# Patient Record
Sex: FEMALE | NOT HISPANIC OR LATINO | Employment: FULL TIME | URBAN - METROPOLITAN AREA
[De-identification: names, ages, dates, MRNs, and addresses within clinical notes are randomized per-mention and may not be internally consistent; named-entity substitution may affect disease eponyms.]

---

## 2017-06-13 ENCOUNTER — TRANSFERRED RECORDS (OUTPATIENT)
Dept: HEALTH INFORMATION MANAGEMENT | Facility: CLINIC | Age: 62
End: 2017-06-13

## 2017-06-13 LAB
HPV ABSTRACT: NORMAL
PAP-ABSTRACT: NORMAL

## 2017-06-14 ENCOUNTER — TRANSFERRED RECORDS (OUTPATIENT)
Dept: HEALTH INFORMATION MANAGEMENT | Facility: CLINIC | Age: 62
End: 2017-06-14

## 2017-06-14 LAB
CHOLEST SERPL-MCNC: 207 MG/DL
GLUCOSE SERPL-MCNC: 86 MG/DL (ref 74–106)
HDLC SERPL-MCNC: 85 MG/DL
HEP C HIM: NORMAL
LDLC SERPL CALC-MCNC: 102 MG/DL
TRIGL SERPL-MCNC: 102 MG/DL

## 2017-06-24 ENCOUNTER — HEALTH MAINTENANCE LETTER (OUTPATIENT)
Age: 62
End: 2017-06-24

## 2018-08-14 ENCOUNTER — TRANSFERRED RECORDS (OUTPATIENT)
Dept: HEALTH INFORMATION MANAGEMENT | Facility: CLINIC | Age: 63
End: 2018-08-14

## 2018-09-05 ENCOUNTER — TRANSFERRED RECORDS (OUTPATIENT)
Dept: HEALTH INFORMATION MANAGEMENT | Facility: CLINIC | Age: 63
End: 2018-09-05

## 2018-09-12 ENCOUNTER — TRANSFERRED RECORDS (OUTPATIENT)
Dept: HEALTH INFORMATION MANAGEMENT | Facility: CLINIC | Age: 63
End: 2018-09-12

## 2018-11-28 ENCOUNTER — RADIANT APPOINTMENT (OUTPATIENT)
Dept: GENERAL RADIOLOGY | Facility: CLINIC | Age: 63
End: 2018-11-28
Attending: FAMILY MEDICINE
Payer: COMMERCIAL

## 2018-11-28 ENCOUNTER — OFFICE VISIT (OUTPATIENT)
Dept: FAMILY MEDICINE | Facility: CLINIC | Age: 63
End: 2018-11-28
Payer: COMMERCIAL

## 2018-11-28 VITALS
HEIGHT: 66 IN | WEIGHT: 109.8 LBS | DIASTOLIC BLOOD PRESSURE: 78 MMHG | SYSTOLIC BLOOD PRESSURE: 120 MMHG | BODY MASS INDEX: 17.64 KG/M2 | TEMPERATURE: 98.2 F | HEART RATE: 72 BPM

## 2018-11-28 DIAGNOSIS — M54.42 ACUTE LEFT-SIDED LOW BACK PAIN WITH LEFT-SIDED SCIATICA: ICD-10-CM

## 2018-11-28 DIAGNOSIS — M54.42 ACUTE LEFT-SIDED LOW BACK PAIN WITH LEFT-SIDED SCIATICA: Primary | ICD-10-CM

## 2018-11-28 PROCEDURE — 99213 OFFICE O/P EST LOW 20 MIN: CPT | Performed by: FAMILY MEDICINE

## 2018-11-28 PROCEDURE — 72100 X-RAY EXAM L-S SPINE 2/3 VWS: CPT | Mod: FY

## 2018-11-28 RX ORDER — TIZANIDINE 2 MG/1
1-4 TABLET ORAL 3 TIMES DAILY
Qty: 30 TABLET | Refills: 1 | Status: SHIPPED | OUTPATIENT
Start: 2018-11-28 | End: 2018-11-28

## 2018-11-28 RX ORDER — RALOXIFENE HYDROCHLORIDE 60 MG/1
1 TABLET, FILM COATED ORAL DAILY
Refills: 3 | COMMUNITY
Start: 2018-11-21 | End: 2019-12-16

## 2018-11-28 RX ORDER — TIZANIDINE 2 MG/1
1-4 TABLET ORAL 3 TIMES DAILY
Qty: 30 TABLET | Refills: 1 | Status: SHIPPED | OUTPATIENT
Start: 2018-11-28

## 2018-11-28 NOTE — PROGRESS NOTES
SUBJECTIVE:                                                    Latonya Thrasher is a 63 year old female who presents to clinic today for the following health issues:    Joint Pain    Onset: 1.5 week, started in the lower back interminttant pain. no longer having pain in the back, now burning sensation in the left thigh, moves at times to the groin.    Description:   Location: left thigh  Character: Burning    Intensity: moderate    Progression of Symptoms: intermittent    Accompanying Signs & Symptoms:  Other symptoms: none    History:   Previous similar pain: YES- back pain in the past      Precipitating factors:   Trauma or overuse: no     Alleviating factors:  Improved by: heat, walked 2 miles yesterday that seem to help for a short time.   Therapies Tried and outcome:         Problem list and histories reviewed & adjusted, as indicated.  Additional history: started with the back now it is inner thigh   advil helps   Heat and she had  Has not had pain that lasted this long   ? Changing sheets may have made it worse   She has osteoporosis and was started on evista   Not weak                     Patient Active Problem List   Diagnosis     History of basal cell carcinoma     Recurrent UTI     Ganglion cyst     Osteopenia     Lumbar radiculopathy     Past Surgical History:   Procedure Laterality Date     BIOPSY OF SKIN LESION       SURGICAL HISTORY OF -       vein ablation     SURGICAL HISTORY OF -   IN 30'S    LAPAROSCOPY FOR INFERTILITY     TONSILLECTOMY  child       Social History   Substance Use Topics     Smoking status: Never Smoker     Smokeless tobacco: Never Used     Alcohol use Yes      Comment: 3-4 weekly     Family History   Problem Relation Age of Onset     Heart Disease Father       age 90 from valve issue     Hypertension Father      Lipids Father      Dementia Mother      Hypertension Sister      Hypertension Sister            ROS:  Constitutional, HEENT, cardiovascular, pulmonary, gi and gu  "systems are negative, except as otherwise noted.    OBJECTIVE:                                                    /78  Pulse 72  Temp 98.2  F (36.8  C) (Tympanic)  Ht 5' 5.5\" (1.664 m)  Wt 109 lb 12.8 oz (49.8 kg)  BMI 17.99 kg/m2 Body mass index is 17.99 kg/(m^2).   GENERAL APPEARANCE: healthy, alert and no distress  ABDOMEN: soft, nontender, without hepatosplenomegaly or masses and bowel sounds normal  MS: extremities normal- no gross deformities noted  Comprehensive back pain exam:  Tenderness of lumbar midback , Pain limits the following motions: extension, Lower extremity strength functional and equal on both sides, Lower extremity reflexes within normal limits bilaterally and Lower extremity sensation normal and equal on both sides     Xray reviewed with patient in the office and showed no spondylo no fracture. + DJD   ASSESSMENT/PLAN:                                                      1. Acute left-sided low back pain with left-sided sciatica  Return if worsening or not improving  - XR Lumbar Spine 2/3 Views; Future  - [facility], HAND, AND CHIROPRACTIC REFERRAL; Future  - tiZANidine (ZANAFLEX) 2 MG tablet; Take 0.5-2 tablets (1-4 mg) by mouth 3 times daily  Dispense: 30 tablet; Refill: 1     reports that she has never smoked. She has never used smokeless tobacco.          Nancy Eddy M.D.  [facility]    "

## 2018-11-28 NOTE — MR AVS SNAPSHOT
After Visit Summary   11/28/2018    Latonya Thrasher    MRN: [id number]           Patient Information     Date Of Birth          1955        Visit Information        Provider Department      11/28/2018 1:30 PM Nancy Eddy MD [facility]        Today's Diagnoses     Acute left-sided low back pain with left-sided sciatica    -  1      Care Instructions             Low Back Pain            What is low back pain?   Low back pain is pain and stiffness in the lower back. It is one of the most common reasons people miss work.   How does it occur?   Your lower back is called your lumbar spine. It is made up of 5 bones called lumbar vertebrae. In between the vertebrae are shock absorbers called disks. Back pain can occur from an injury to the vertebrae or when a disk bulges or herniates.   Low back pain is usually caused when a ligament or muscle holding a vertebra in its proper position is strained. Vertebrae are bones that make up the spinal column through which the spinal cord passes. When these muscles or ligaments become weak or strained, the spine loses its stability, resulting in pain.   Low back pain can occur if your job involves lifting and carrying heavy objects, or if you spend a lot of time sitting or standing in one position or bending over. It can be caused by a fall or by unusually strenuous exercise. It can be brought on by the tension and stress that cause headaches in some people. It can even be brought on by violent sneezing or coughing.   People who are overweight may have low back pain because of the added stress on their back.   Back pain may occur when the muscles, joints, bones, and connective tissues of the back become inflamed as a result of an infection or an immune system problem. Arthritic disorders as well as some congenital and degenerative conditions may cause back pain.   Back pain accompanied by loss of bladder or bowel control, trouble moving your legs, or  numbness or tingling in your arms or legs requires immediate medical treatment.   What are the symptoms?   Symptoms include:   pain in the back or legs   stiffness, spasm, or limited motion   The pain may be constant or may happen only in certain positions. It may get worse when you cough, sneeze, bend, twist, or strain during a bowel movement. The pain may be in only one spot or may spread to other areas, most commonly down the buttocks and into the back of the thigh.   A low back strain typically does not produce pain past the knee into the calf or foot. Tingling or numbness in the calf or foot may indicate a herniated disk or pinched nerve.   Be sure to see your healthcare provider if:   You have weakness in your leg, especially if you cannot lift your foot, because this may be a sign of nerve damage.   You have new bowel or bladder problems as well as back pain, which may be a sign of severe injury to your spinal cord.   You have pain that gets worse despite treatment.   How is it diagnosed?   Your healthcare provider will review your medical history and examine you. You may have X-rays, an MRI, CT scan, or a bone scan.   How is it treated?   To treat this condition:   Put an ice pack, gel pack, or package of frozen vegetables, wrapped in a cloth on the area every 3 to 4 hours, for up to 20 minutes at a time for the first 2 or 3 days.   Use a heating pad or hot water bottle. Don't let the heating pad get too hot, and don't fall asleep with it. You could get a burn.   Rest in bed on a firm mattress. Often it helps to lie on your back with your knees raised on a pillow. However, some people prefer to lie on their side with their knees bent. It's best to try to stay active, so try not to rest in bed longer than 1 to 2 days.   Take muscle relaxants as recommended by your healthcare provider.   Take an anti-inflammatory such as ibuprofen, or other medicine as directed by your provider. Nonsteroidal anti-inflammatory  medicines (NSAIDs) may cause stomach bleeding and other problems. These risks increase with age. Read the label and take as directed. Unless recommended by your healthcare provider, do not take for more than 10 days.   Get a back massage by a trained person.   Wear a belt or corset to support your back.   Do the exercises recommended by your provider. Your provider may also prescribe physical therapy.   Talk with a counselor, if your back pain is related to tension caused by emotional problems.   When the pain is gone, ask your healthcare provider about starting an exercise program such as the following:   Exercise moderately every day, using stretching and warm-up exercises suggested by your provider or physical therapist.   Exercise vigorously for about 30 minutes 3 times a week by walking, swimming, using a stationary bicycle, or doing low-impact aerobics.   Exercising regularly will not only help your back, it will also help keep you healthier overall.   How long will the effects last?   The effects of back pain last as long as the cause exists or until your body recovers from the strain, usually a day or two but sometimes weeks.   How can I take care of myself?   In addition to the treatment described above, keep in mind these suggestions:   Practice good posture. Stand with your head up, shoulders straight, chest forward, weight balanced evenly on both feet, and pelvis tucked in.   Lose weight if you are overweight   Keep your core muscles strong. These are your abdominal and back muscles.   Sleep without a pillow under your head.   Pain is the best way to  the pace you should set in increasing your activity and exercise. Minor discomfort, stiffness, soreness, and mild aches need not interfere with activity. However, limit your activities temporarily if:   Your symptoms return.   The pain increases when you are more active.   The pain increases within 24 hours after a new or higher level of activity.    When can I return to my normal activities?   Everyone recovers from an injury at a different rate. Return to your activities depends on how soon your back recovers, not by how many days or weeks it has been since your injury has occurred. In general, the longer you have symptoms before you start treatment, the longer it will take to get better. The goal is to return to your normal activities as soon as is safely possible. If you return too soon you may worsen your injury.   It is important that you have fully recovered from your low back pain before you return to any strenuous activity. You must be able to have the same range of motion that you had before your injury. You must be able to walk and twist without pain.   What can I do to help prevent low back pain?   You can reduce the strain on your back by doing the following:   Don't push with your arms when you move a heavy object. Turn around and push backwards so the strain is taken by your legs.   Whenever you sit, sit in a straight-backed chair and hold your spine against the back of the chair.   Bend your knees and hips and keep your back straight when you lift a heavy object.   Avoid lifting heavy objects higher than your waist.   Hold packages you carry close to your body, with your arms bent.   Use a footrest for one foot when you stand or sit in one spot for a long time. This keeps your back straight.   Bend your knees when you bend over.   Sit close to the pedals when you drive and use your seat belt and a hard backrest or pillow.   Lie on your side with your knees bent when you sleep or rest. It may help to put a pillow between your knees.   Put a pillow under your knees when you sleep on your back.   Raise the foot of the bed 8 inches to discourage sleeping on your stomach unless you have other problems that require that you keep your head elevated.   To rest your back, hold each of these positions for 5?minutes or longer:   Lie on your back, bend  your knees, and put pillows under your knees.   Lie on your back on the floor with a pillow under your neck. Bend your knees to a 90-degree angle, and put your lower legs and feet on a chair.   Lie on your back, bend your knees, and bring one knee up to your chest and hold it there. Repeat with the other knee, then bring both knees to your chest. When holding your knee to your chest, grab your thigh rather than your lower leg to avoid over flexing your knee.     Published by [employer].  This content is reviewed periodically and is subject to change as new health information becomes available. The information is intended to inform and educate and is not a replacement for medical evaluation, advice, diagnosis or treatment by a healthcare professional.   Developed by Clare Mendoza RN, [place], and [facility].   ? 2010 [facility] and/or its affiliates. All Rights Reserved.           Low Back Pain Exercise          Standing hamstring stretch: Put the heel of one leg on a stool about 15 inches high. Keep your leg straight. Lean forward, bending at the hips until you feel a mild stretch in the back of your thigh. Make sure you do not roll your shoulders or bend at the waist when doing this. You want to stretch your leg, not your lower back. Hold the stretch for 15 to 30 seconds. Repeat with each leg 3 times.   Cat and camel: Get down on your hands and knees. Let your stomach sag, allowing your back to curve downward. Hold this position for 5 seconds. Then arch your back and hold for 5 seconds. Do 3 sets of 10.   Quadruped arm and leg raise: Get down on your hands and knees. Pull in your belly button and tighten your abdominal muscles to stiffen your spine. While keeping your abdominals tight, raise one arm and the opposite leg away from you. Hold this position for 5 seconds. Lower your arm and leg slowly and change sides. Do this 10 times on each side.   Pelvic tilt: Lie on your back with your knees bent and your feet  flat on the floor. Tighten your abdominal muscles and push your lower back into the floor. Hold this position for 5 seconds, then relax. Do 3 sets of 10.   Partial curl: Lie on your back with your knees bent and your feet flat on the floor. Tighten your stomach muscles. Tuck your chin to your chest. With your hands stretched out in front of you, curl your upper body forward until your shoulders clear the floor. Hold this position for 3 seconds. Don't hold your breath. It helps to breathe out as you lift your shoulders up. Relax back to the floor. Repeat 10 times. Build to 3 sets of 10. To challenge yourself, clasp your hands behind your head and keep your elbows out to the side.   Gluteal stretch: Lie on your back with both knees bent. Rest the ankle of one leg over the knee of your other leg. Grasp the thigh of the bottom leg and pull toward your chest. You will feel a stretch along the buttocks and possibly along the outside of your hip. Hold the stretch for 15 to 30 seconds. Repeat 3 times with each leg.   Extension exercise:   0. Lie face down on the floor for 5 minutes. If this hurts too much, lie face down with a pillow under your stomach. This should relieve your leg or back pain. When you can lie on your stomach for 5 minutes without a pillow, you can continue with Part B of this exercise.   0. After lying on your stomach for 5 minutes, prop yourself up on your elbows for another 5 minutes. If you can do this without having more leg or buttock pain, you can start doing part C of this exercise.   0. Lie on your stomach with your hands under your shoulders. Then press down on your hands and extend your elbows while keeping your hips flat on the floor. Hold for 1 second and lower yourself to the floor. Do 3 to 5 sets of 10 repetitions. Rest for 1 minute between sets. You should have no pain in your legs when you do this, but it is normal to feel some pain in your lower back.   Do this exercise several times a  day.   Side plank: Lie on your side with your legs, hips, and shoulders in a straight line. Prop yourself up onto your forearm so your elbow is directly under your shoulder. Lift your hips off the floor and balance on your forearm and the outside of your foot. Try to hold this position for 15 seconds, then slowly lower your hip to the ground. Switch sides and repeat. Work up to holding for 1 minute or longer. This exercise can be made easier by starting with your knees and hips flexed toward your chest.   Published by [employer].  This content is reviewed periodically and is subject to change as new health information becomes available. The information is intended to inform and educate and is not a replacement for medical evaluation, advice, diagnosis or treatment by a healthcare professional.   Written by Mary Peterson MS, PT, and Clare Frank PT, [facility], [facility], for [facility]   ? 2010 [facility] and/or its affiliates. All Rights Reserved.         Copyright   Clinical Reference Systems 2011                      Follow-ups after your visit        Additional Services     [facility] PT, HAND, AND CHIROPRACTIC REFERRAL       Physical Therapy, Hand Therapy and Chiropractic Care are available through:  *[facility]  *Hand Therapy (Occupational Therapy or Physical Therapy)  *[facility]    Call one number to schedule at any of the above locations: [phone].    Physical therapy, Hand therapy and/or Chiropractic care has been recommended by your physician as an excellent treatment option to reduce pain and help people return to normal activities, including sports.  Therapy and/or chiropractic care services are a great complement or alternative to expensive and invasive surgery, injections, or long-term use of prescription medications. The primary goal is to identify the underlying problem and provide you the tools to manage your condition on your own.     Please be aware that  "coverage of these services is subject to the terms and limitations of your health insurance plan.  Call member services at your health plan with any benefit or coverage questions.      Please bring the following to your appointment:  *Your personal calendar for scheduling future appointments  *Comfortable clothing                  Future tests that were ordered for you today     Open Future Orders        Priority Expected Expires Ordered    [facility] PT, HAND, AND CHIR[facility]RACTIC REFERRAL Routine  11/28/2019 11/28/2018            Who to contact     Normal or non-critical lab and imaging results will be communicated to you by [employer], letter or phone within 4 business days after the clinic has received the results. If you do not hear from us within 7 days, please contact the clinic through [employer] or phone. If you have a critical or abnormal lab result, we will notify you by phone as soon as possible.  Submit refill requests through [employer] or call your pharmacy and they will forward the refill request to us. Please allow 3 business days for your refill to be completed.          If you need to speak with a  for additional information , please call: [phone]             Additional Information About Your Visit        [employer] Information     [employer] gives you secure access to your electronic health record. If you see a primary care provider, you can also send messages to your care team and make appointments. If you have questions, please call your primary care clinic.  If you do not have a primary care provider, please call [phone] and they will assist you.        Care EveryWhere ID     This is your Care EveryWhere ID. This could be used by other organizations to access your [facility] medical records  [id number]        Your Vitals Were     Pulse Temperature Height BMI (Body Mass Index)          72 98.2  F (36.8  C) (Tympanic) 5' 5.5\" (1.664 m) 17.99 kg/m2         Blood Pressure from Last 3 Encounters: "   11/28/18 120/78   02/03/15 122/72    Weight from Last 3 Encounters:   11/28/18 109 lb 12.8 oz (49.8 kg)   02/03/15 116 lb 3.2 oz (52.7 kg)                 Today's Medication Changes          These changes are accurate as of 11/28/18  2:41 PM.  If you have any questions, ask your nurse or doctor.               Start taking these medicines.        Dose/Directions    tiZANidine 2 MG tablet   Commonly known as:  ZANAFLEX   Used for:  Acute left-sided low back pain with left-sided sciatica   Started by:  Nancy Eddy MD        Dose:  1-4 mg   Take 0.5-2 tablets (1-4 mg) by mouth 3 times daily   Quantity:  30 tablet   Refills:  1         Stop taking these medicines if you haven't already. Please contact your care team if you have questions.     CALCIUM + D PO   Stopped by:  Nancy Eddy MD           MAGNESIUM OXIDE PO   Stopped by:  Nancy Eddy MD           omega-3 acid ethyl esters 1 g capsule   Commonly known as:  Lovaza   Stopped by:  Nancy Eddy MD                Where to get your medicines      [facility] [address] [place] [address] [address] [facility] [address] [place] [address]  [address] [facility] [address]     Phone:  [phone]     tiZANidine 2 MG tablet                Primary Care Provider Office Phone # Fax #    Petty Sirena Hodges [phone] [address] [facility] [address]        Equal Access to Services     [facility]: Hadii mehrdad hinson hadasho Soalee, waaxda luqadaha, qaybta kaalmada blair, gaurang dick. So [facility] [phone].    ATENCIÓN: Si habla español, tiene a jerez disposición servicios gratuitos de asistencia lingüística. Llame al [phone].    We comply with applicable federal civil rights laws and [place] laws. We do not discriminate on the basis of race, color, national origin, age, disability, sex, sexual orientation, or gender identity.             Thank you!     Thank you for choosing [facility]  for your care. Our goal is always to provide you with excellent care. Hearing back from our patients is one way we can continue to improve our services. Please take a few minutes to complete the written survey that you may receive in the mail after your visit with us. Thank you!             Your Updated Medication List - Protect others around you: Learn how to safely use, store and throw away your medicines at www.disposemymeds.org.          This list is accurate as of 11/28/18  2:41 PM.  Always use your most recent med list.                   Brand Name Dispense Instructions for use Diagnosis    BIOTIN PO           calcium carbonate 600 MG tablet    OS-ALONZO 600 mg [place]. [place]     Take 1 Tab by mouth Once Daily.        DAILY MULTIVITAMIN PO      Take by mouth daily        GLUCOSAMINE CHONDROITIN JOINT PO           MELATONIN PO      Take by mouth nightly as needed        OMEGA-3 FISH OIL PO      Take by mouth daily        raloxifene 60 MG tablet    EVISTA     Take 1 tablet by mouth daily        tiZANidine 2 MG tablet    ZANAFLEX    30 tablet    Take 0.5-2 tablets (1-4 mg) by mouth 3 times daily    Acute left-sided low back pain with left-sided sciatica       VITAMIN D PO

## 2018-11-30 ENCOUNTER — THERAPY VISIT (OUTPATIENT)
Dept: PHYSICAL THERAPY | Facility: CLINIC | Age: 63
End: 2018-11-30
Payer: MEDICARE

## 2018-11-30 DIAGNOSIS — M54.42 ACUTE LEFT-SIDED LOW BACK PAIN WITH LEFT-SIDED SCIATICA: ICD-10-CM

## 2018-11-30 PROCEDURE — G8978 MOBILITY CURRENT STATUS: HCPCS | Mod: GP | Performed by: PHYSICAL THERAPIST

## 2018-11-30 PROCEDURE — 97140 MANUAL THERAPY 1/> REGIONS: CPT | Mod: GP | Performed by: PHYSICAL THERAPIST

## 2018-11-30 PROCEDURE — 97161 PT EVAL LOW COMPLEX 20 MIN: CPT | Mod: GP | Performed by: PHYSICAL THERAPIST

## 2018-11-30 PROCEDURE — 97110 THERAPEUTIC EXERCISES: CPT | Mod: GP | Performed by: PHYSICAL THERAPIST

## 2018-11-30 PROCEDURE — G8979 MOBILITY GOAL STATUS: HCPCS | Mod: GP | Performed by: PHYSICAL THERAPIST

## 2018-11-30 NOTE — PROGRESS NOTES
[facility] Initial Evaluation  Subjective:  Patient is a 63 year old female presenting with rehab back hpi. The history is provided by the patient and medical records.   Latonya Thrasher is a 63 year old female with a lumbar condition.  Condition occurred with:  Insidious onset.  Condition occurred: for unknown reasons.  This is a new condition  Low back pain started 2 weeks ago 11/16/18 and then left leg pain started really worsening a week ago..    Patient reports pain:  Lumbar spine left and SI joint left.  Radiates to:  Thigh left.  Pain is described as aching and burning and is intermittent and reported as 5/10.   Pain is worse in the P.M. and worse during the night.  Symptoms are exacerbated by standing, walking and certain positions and relieved by heat and NSAID's (stretching hamstring helpful ).  Since onset symptoms are gradually worsening.  Special tests:  X-ray.      General health as reported by patient is excellent.  Pertinent medical history includes:  Osteoporosis.      Current medications:  Meds to increase bone density (just picked up rx for mm relaxers).  Current occupation is Retired.  Walking for exercise.        Barriers include:  None as reported by the patient.                            Objective:  Standing Alignment:        Lumbar:  Lordosis decr  Pelvic:  Normal                         Lumbar/SI Evaluation  ROM:    AROM Lumbar:   Flexion:        Hands to ankles, decreases back and leg pain  Ext:                    End range pain and peripheralizes pain, moderate loss fo ext   Side Bend:        Left:     Right:   Rotation:           Left:     Right:   Side Glide:        Left:     Right:           Lumbar Myotomes:  normal                Lumbar Dermtomes:  normal                Neural Tension/Mobility:      Left side:SLR (relieving of pain)  negative.     Right side:   SLR  negative.   Lumbar Palpation:    Tenderness present at Left:    Erector Spinae; Piriformis and  Gluteus Medius          SI joint/Sacrum:          Left negative at:    Forward bend standing  Right positive at:    Forward bend standing                                                 General     ROS    Assessment/Plan:    Patient is a 63 year old female with lumbar complaints.    Patient has the following significant findings with corresponding treatment plan.                Diagnosis 1:  Lumbar radiculopathy  Pain -  manual therapy and education  Decreased ROM/flexibility - manual therapy and therapeutic exercise    Therapy Evaluation Codes:   1) History comprised of:   Personal factors that impact the plan of care:      None.    Comorbidity factors that impact the plan of care are:      None.     Medications impacting care: Anti-inflammatory and Muscle relaxant.  2) Examination of Body Systems comprised of:   Body structures and functions that impact the plan of care:      Lumbar spine.   Activity limitations that impact the plan of care are:      Standing, Walking and Laying down.  3) Clinical presentation characteristics are:   Stable/Uncomplicated.  4) Decision-Making    Low complexity using standardized patient assessment instrument and/or measureable assessment of functional outcome.  Cumulative Therapy Evaluation is: Low complexity.    Previous and current functional limitations:  (See Goal Flow Sheet for this information)    Short term and Long term goals: (See Goal Flow Sheet for this information)     Communication ability:  Patient appears to be able to clearly communicate and understand verbal and written communication and follow directions correctly.  Treatment Explanation - The following has been discussed with the patient:   RX ordered/plan of care  Anticipated outcomes  Possible risks and side effects  This patient would benefit from PT intervention to resume normal activities.   Rehab potential is excellent.    Frequency:  2 X week, once daily  Duration:  for 2 weeks tapering to 1 X a week over 2  weeks  Discharge Plan:  Achieve all LTG.  Independent in home treatment program.  Reach maximal therapeutic benefit.    Please refer to the daily flowsheet for treatment today, total treatment time and time spent performing 1:1 timed codes.

## 2018-11-30 NOTE — MR AVS SNAPSHOT
After Visit Summary   11/30/2018    Latonya Thrasher    MRN: [id number]           Patient Information     Date Of Birth          1955        Visit Information        Provider Department      11/30/2018 9:30 AM Alice Conrad, PT [facility]        Today's Diagnoses     Acute left-sided low back pain with left-sided sciatica           Follow-ups after your visit        Your next 10 appointments already scheduled     Dec 03, 2018  8:20 AM CST   RAISA Spine with Alice Conrad PT   [facility] ([facility])    [address] [facility] [facility] [address]   [phone]            Dec 07, 2018  8:50 AM CST   RAISA Spine with Alice Conrda PT   [facility] ([facility])    [address] [facility] [address]   [phone]            Dec 10, 2018  9:00 AM CST   RAISA Spine with Alice Conrad PT   [facility] ([facility])    [address] [facility] [address]   [phone]            Dec 12, 2018  8:00 AM CST   RAISA Spine with Alice Conrad PT   [facility] ([facility])    [address] [facility] [address]   [phone]            Dec 17, 2018  9:40 AM CST   RAISA Spine with Alice Conrad PT   [facility] ([facility])    [address] [facility] [facility] [address]   [phone]            Dec 19, 2018 10:40 AM CST   RAISA Spine with Alice Conrad PT   [facility] ([facility])    [address] [facility] [address]   [phone]            Dec 26, 2018  8:40 AM CST   RAISA Spine with Alice Conrad PT   [facility] ([facility])    [address] [address]   [phone]            Dec 28, 2018  8:10 AM CST   RAISA Spine with Alice Conrad PT   [facility] ([facility])    [address] [place] [id number] [phone]              Who to contact     If you have questions or need follow up information about  today's clinic visit or your schedule please contact [facility] directly at [phone].  Normal or non-critical lab and imaging results will be communicated to you by MyChart, letter or phone within 4 business days after the clinic has received the results. If you do not hear from us within 7 days, please contact the clinic through [employer] or phone. If you have a critical or abnormal lab result, we will notify you by phone as soon as possible.  Submit refill requests through [employer] or call your pharmacy and they will forward the refill request to us. Please allow 3 business days for your refill to be completed.          Additional Information About Your Visit        [employer] Information     [employer] gives you secure access to your electronic health record. If you see a primary care provider, you can also send messages to your care team and make appointments. If you have questions, please call your primary care clinic.  If you do not have a primary care provider, please call [phone] and they will assist you.        Care EveryWhere ID     This is your Care EveryWhere ID. This could be used by other organizations to access your [place] medical records  [id number]         Blood Pressure from Last 3 Encounters:   11/28/18 120/78   02/03/15 122/72    Weight from Last 3 Encounters:   11/28/18 49.8 kg (109 lb 12.8 oz)   02/03/15 52.7 kg (116 lb 3.2 oz)              We Performed the Following     RAISA CERT REPORT     RAISA PT, HAND, AND CHIROPRACTIC REFERRAL     Manual Ther Tech, 1+Regions, EA 15 min     PT Eval, Low Complexity ([id number])     Therapeutic Exercises        Primary Care Provider Office Phone # Fax #    Petty Hodges [phone] [address]  [facility] [address]        Equal Access to Services     PRIYA HIGGINS : Nissa Navarrete, jaswinder nowak, ajith pinto, gaurang dick. So [facility]  [phone].    ATENCIÓN: Si talib casanova, tiene a jerez disposición servicios gratuitos de asistencia lingüística. Jaylen pickard [phone].    We comply with applicable federal civil rights laws and [place] laws. We do not discriminate on the basis of race, color, national origin, age, disability, sex, sexual orientation, or gender identity.            Thank you!     Thank you for choosing [facility]  for your care. Our goal is always to provide you with excellent care. Hearing back from our patients is one way we can continue to improve our services. Please take a few minutes to complete the written survey that you may receive in the mail after your visit with us. Thank you!             Your Updated Medication List - Protect others around you: Learn how to safely use, store and throw away your medicines at www.disposemymeds.org.          This list is accurate as of 11/30/18 12:12 PM.  Always use your most recent med list.                   Brand Name Dispense Instructions for use Diagnosis    BIOTIN PO           calcium carbonate 600 MG tablet    OS-ALONZO 600 mg [place]. Ca     Take 1 Tab by mouth Once Daily.        DAILY MULTIVITAMIN PO      Take by mouth daily        GLUCOSAMINE CHONDROITIN JOINT PO           MELATONIN PO      Take by mouth nightly as needed        OMEGA-3 FISH OIL PO      Take by mouth daily        raloxifene 60 MG tablet    EVISTA     Take 1 tablet by mouth daily        tiZANidine 2 MG tablet    ZANAFLEX    30 tablet    Take 0.5-2 tablets (1-4 mg) by mouth 3 times daily    Acute left-sided low back pain with left-sided sciatica       VITAMIN D PO

## 2018-11-30 NOTE — LETTER
DEPARTMENT OF HEALTH AND HUMAN SERVICES  CENTERS FOR MEDICARE & MEDICAID SERVICES    PLAN/UPDATED PLAN OF PROGRESS FOR OUTPATIENT REHABILITATION    PATIENTS NAME:  Latonya Thrasher   : 1955  PROVIDER NUMBER:    [id number]  HICN: [id number]    PROVIDER NAME: [facility]  MEDICAL RECORD NUMBER: [id number]     START OF CARE DATE:  SOC Date: 18   TYPE:  PT    PRIMARY/TREATMENT DIAGNOSIS: (Pertinent Medical Diagnosis)  Acute left-sided low back pain with left-sided sciatica    VISITS FROM START OF CARE:  Rxs Used: 1     [facility] Initial Evaluation  Subjective:  Patient is a 63 year old female presenting with rehab back hpi. The history is provided by the patient and medical records.   Latonya Thrasher is a 63 year old female with a lumbar condition.  Condition occurred with:  Insidious onset.  Condition occurred: for unknown reasons.  This is a new condition  Low back pain started 2 weeks ago 18 and then left leg pain started really worsening a week ago..    Patient reports pain:  Lumbar spine left and SI joint left.  Radiates to:  Thigh left.  Pain is described as aching and burning and is intermittent and reported as 5/10.   Pain is worse in the P.M. and worse during the night.  Symptoms are exacerbated by standing, walking and certain positions and relieved by heat and NSAID's (stretching hamstring helpful ).  Since onset symptoms are gradually worsening.  Special tests:  X-ray.      General health as reported by patient is excellent.  Pertinent medical history includes:  Osteoporosis.      Current medications:  Meds to increase bone density (just picked up rx for mm relaxers).  Current occupation is Retired.  Walking for exercise.   Barriers include:  None as reported by the patient.                    Objective:  Standing Alignment:    Lumbar:  Lordosis decr  Pelvic:  Normal       Lumbar/SI Evaluation  ROM:    AROM Lumbar:   Flexion:        Hands to ankles,  decreases back and leg pain  Ext:                    End range pain and peripheralizes pain, moderate loss fo ext   Side Bend:        Left:     Right:   Rotation:           Left:     Right:   Side Glide:        Left:     Right:            PATIENTS NAME:  Latonya Thrasher   : 1955       Lumbar Myotomes:  normal  Lumbar Dermtomes:  normal  Neural Tension/Mobility:    Left side:SLR (relieving of pain)  negative.   Right side:   SLR  negative.   Lumbar Palpation:    Tenderness present at Left:    Erector Spinae; Piriformis and Gluteus Medius  SI joint/Sacrum:      Left negative at:    Forward bend standing  Right positive at:    Forward bend standing    Assessment/Plan:    Patient is a 63 year old female with lumbar complaints.    Patient has the following significant findings with corresponding treatment plan.                Diagnosis 1:  Lumbar radiculopathy  Pain -  manual therapy and education  Decreased ROM/flexibility - manual therapy and therapeutic exercise    Therapy Evaluation Codes:   1) History comprised of:   Personal factors that impact the plan of care:      None.    Comorbidity factors that impact the plan of care are:      None.     Medications impacting care: Anti-inflammatory and Muscle relaxant.  2) Examination of Body Systems comprised of:   Body structures and functions that impact the plan of care:      Lumbar spine.   Activity limitations that impact the plan of care are:      Standing, Walking and Laying down.  3) Clinical presentation characteristics are:   Stable/Uncomplicated.  4) Decision-Making    Low complexity using standardized patient assessment instrument and/or measureable assessment of functional outcome.  Cumulative Therapy Evaluation is: Low complexity.    Previous and current functional limitations:  (See Goal Flow Sheet for this information)    Short term and Long term goals: (See Goal Flow Sheet for this information)     Communication ability:  Patient appears to be able to  "clearly communicate and understand verbal and written communication and follow directions correctly.  Treatment Explanation - The following has been discussed with the patient:   RX ordered/plan of care  Anticipated outcomes  Possible risks and side effects  This patient would benefit from PT intervention to resume normal activities.   Rehab potential is excellent.        PATIENTS NAME:  Latonya Thrasher   : 1955      Frequency:  2 X week, once daily  Duration:  for 2 weeks tapering to 1 X a week over 2 weeks  Discharge Plan:  Achieve all LTG.  Independent in home treatment program.  Reach maximal therapeutic benefit.        Caregiver Signature/Credentials _____________________________ Date ________       Treating Provider: Alice Conrad PT     I have reviewed and certified the need for these services and plan of treatment while under my care.        PHYSICIAN'S SIGNATURE:   _________________________________________  Date___________   Nancy Eddy MD     Certification period:  Beginning of Cert date period: 18 to  End of Cert period date: 18     Functional Level Progress Report: Please see attached \"Goal Flow sheet for Functional level.\"    ____X____ Continue Services or       ________ DC Services                Service dates: From  SOC Date: 18 date to present                         "

## 2018-12-03 ENCOUNTER — THERAPY VISIT (OUTPATIENT)
Dept: PHYSICAL THERAPY | Facility: CLINIC | Age: 63
End: 2018-12-03
Payer: MEDICARE

## 2018-12-03 DIAGNOSIS — M54.16 LUMBAR RADICULOPATHY: ICD-10-CM

## 2018-12-03 PROCEDURE — 97140 MANUAL THERAPY 1/> REGIONS: CPT | Mod: GP | Performed by: PHYSICAL THERAPIST

## 2018-12-03 PROCEDURE — 97110 THERAPEUTIC EXERCISES: CPT | Mod: GP | Performed by: PHYSICAL THERAPIST

## 2018-12-05 ENCOUNTER — THERAPY VISIT (OUTPATIENT)
Dept: PHYSICAL THERAPY | Facility: CLINIC | Age: 63
End: 2018-12-05
Payer: MEDICARE

## 2018-12-05 DIAGNOSIS — M54.16 LUMBAR RADICULOPATHY: ICD-10-CM

## 2018-12-05 PROCEDURE — 97140 MANUAL THERAPY 1/> REGIONS: CPT | Mod: GP | Performed by: PHYSICAL THERAPIST

## 2018-12-05 PROCEDURE — 97110 THERAPEUTIC EXERCISES: CPT | Mod: GP | Performed by: PHYSICAL THERAPIST

## 2018-12-10 ENCOUNTER — THERAPY VISIT (OUTPATIENT)
Dept: PHYSICAL THERAPY | Facility: CLINIC | Age: 63
End: 2018-12-10
Payer: MEDICARE

## 2018-12-10 DIAGNOSIS — M54.16 LUMBAR RADICULOPATHY: ICD-10-CM

## 2018-12-10 PROCEDURE — 97110 THERAPEUTIC EXERCISES: CPT | Mod: GP | Performed by: PHYSICAL THERAPIST

## 2018-12-10 PROCEDURE — 97140 MANUAL THERAPY 1/> REGIONS: CPT | Mod: GP | Performed by: PHYSICAL THERAPIST

## 2018-12-10 NOTE — PROGRESS NOTES
Subjective:  HPI                    Objective:  System    Physical Exam    General     ROS    Assessment/Plan:    DISCHARGE REPORT    Progress reporting period is from 11/30/18 to 12/10/2018.       SUBJECTIVE  Subjective changes noted by patient:    Subjective: Latonya really feels good.  She wakes up sometimes with burning into leg but is able to reduce with flexion exercises.  She is sometimes damir to do full press up without pain.    Current Pain level: 2/10.      Initial Pain level: 5/10.   Changes in function:  Yes (See Goal flowsheet attached for changes in current functional level)  Adverse reaction to treatment or activity: None    OBJECTIVE  Changes noted in objective findings:  Yes,   Objective: Mmild loss of lumbar extension with full press up, pain only increase after several reps.  Felxion - hands to ankles no pain.  Did well with progression of trunk stability.  Piriformis mildly tender     ASSESSMENT/PLAN  Updated problem list and treatment plan: Diagnosis 1:  Lumbar radiculopathy  Pain -  home program  Decreased ROM/flexibility - home program  Decreased strength - home program  STG/LTGs have been met or progress has been made towards goals:  Yes (See Goal flow sheet completed today.)  Assessment of Progress: The patient has met all of their long term goals.  Self Management Plans:  Patient is independent in a home treatment program.  I have re-evaluated this patient and find that the nature, scope, duration and intensity of the therapy is appropriate for the medical condition of the patient.  Latonya continues to require the following intervention to meet STG and LTG's:  PT intervention is no longer required to meet STG/LTG.    Recommendations:  This patient is ready to be discharged from therapy and continue their home treatment program.    Please refer to the daily flowsheet for treatment today, total treatment time and time spent performing 1:1 timed codes.

## 2018-12-27 PROBLEM — M54.16 LUMBAR RADICULOPATHY: Status: RESOLVED | Noted: 2018-12-03 | Resolved: 2018-12-27

## 2019-10-29 ENCOUNTER — TELEPHONE (OUTPATIENT)
Dept: FAMILY MEDICINE | Facility: CLINIC | Age: 64
End: 2019-10-29

## 2019-10-29 NOTE — TELEPHONE ENCOUNTER
Panel Management Review      Patient has the following on her problem list: None      Composite cancer screening  Chart review shows that this patient is due/due soon for the following Colonoscopy  Summary:    Patient is due/failing the following:   Medicare Visit and COLONOSCOPY    Action needed:   Patient needs office visit for a Medicare Visit. and Patient needs referral/order: colonoscopy    Type of outreach:    Phone, spoke to patient.  made appointment for 10/31/2019 at 11:30 am    Questions for provider review:    None                                                                                                                                    Ivette Pitts CMA       Chart routed to none.

## 2019-10-31 ENCOUNTER — ALLIED HEALTH/NURSE VISIT (OUTPATIENT)
Dept: FAMILY MEDICINE | Facility: CLINIC | Age: 64
End: 2019-10-31
Payer: COMMERCIAL

## 2019-10-31 DIAGNOSIS — Z12.11 SPECIAL SCREENING FOR MALIGNANT NEOPLASMS, COLON: ICD-10-CM

## 2019-10-31 DIAGNOSIS — Z23 NEED FOR PNEUMOCOCCAL VACCINATION: Primary | ICD-10-CM

## 2019-10-31 DIAGNOSIS — Z12.11 SCREEN FOR COLON CANCER: ICD-10-CM

## 2019-10-31 PROCEDURE — G0009 ADMIN PNEUMOCOCCAL VACCINE: HCPCS

## 2019-10-31 PROCEDURE — 99207 ZZC NO CHARGE NURSE ONLY: CPT

## 2019-10-31 PROCEDURE — 90732 PPSV23 VACC 2 YRS+ SUBQ/IM: CPT

## 2019-10-31 NOTE — PROGRESS NOTES
Prior to immunization administration, verified patients identity using patient s name and date of birth. Please see Immunization Activity for additional information.     Screening Questionnaire for Adult Immunization    Are you sick today?   No   Do you have allergies to medications, food, a vaccine component or latex?   No   Have you ever had a serious reaction after receiving a vaccination?   No   Do you have a long-term health problem with heart disease, lung disease, asthma, kidney disease, metabolic disease (e.g. diabetes), anemia, or other blood disorder?   No   Do you have cancer, leukemia, HIV/AIDS, or any other immune system problem?   No   In the past 3 months, have you taken medications that affect  your immune system, such as prednisone, other steroids, or anticancer drugs; drugs for the treatment of rheumatoid arthritis, Crohn s disease, or psoriasis; or have you had radiation treatments?   No   Have you had a seizure, or a brain or other nervous system problem?   No   During the past year, have you received a transfusion of blood or blood     products, or been given immune (gamma) globulin or antiviral drug?   No   For women: Are you pregnant or is there a chance you could become        pregnant during the next month?   No   Have you received any vaccinations in the past 4 weeks?   No     Immunization questionnaire answers were all negative.        Per orders of Dr. salazar, injection of pneu 23 given by Kae Moran CMA. Patient instructed to remain in clinic for 15 minutes afterwards, and to report any adverse reaction to me immediately.       Screening performed by Kae Moran CMA on 10/31/2019 at 12:56 PM.

## 2019-12-16 ENCOUNTER — HEALTH MAINTENANCE LETTER (OUTPATIENT)
Age: 64
End: 2019-12-16

## 2019-12-17 ENCOUNTER — ANESTHESIA EVENT (OUTPATIENT)
Dept: GASTROENTEROLOGY | Facility: CLINIC | Age: 64
End: 2019-12-17
Payer: COMMERCIAL

## 2019-12-17 NOTE — ANESTHESIA PREPROCEDURE EVALUATION
"Anesthesia Pre-Procedure Evaluation    Patient: Latonya Thrasher   MRN: [id number] : 1955          Preoperative Diagnosis: Special screening for malignant neoplasms, colon [Z12.11]    Procedure(s):  COLONOSCOPY    Past Medical History:   Diagnosis Date     Basal cell carcinoma      Past Surgical History:   Procedure Laterality Date     BIOPSY OF SKIN LESION       SURGICAL HISTORY OF -       vein ablation     SURGICAL HISTORY OF -   IN 30'S    LAPAROSCOPY FOR INFERTILITY     TONSILLECTOMY  child       Anesthesia Evaluation     . Pt has had prior anesthetic. Type: General           ROS/MED HX    ENT/Pulmonary:       Neurologic:       Cardiovascular:         METS/Exercise Tolerance:     Hematologic:         Musculoskeletal:         GI/Hepatic:         Renal/Genitourinary:         Endo:         Psychiatric:         Infectious Disease:         Malignancy:   (+) Malignancy History of Skin          Other:                          Physical Exam  Normal systems: cardiovascular, pulmonary and dental    Airway   Mallampati: II  TM distance: >3 FB  Neck ROM: full    Dental     Cardiovascular       Pulmonary             Lab Results   Component Value Date    GLC 86 2017       Preop Vitals  BP Readings from Last 3 Encounters:   18 120/78   02/03/15 122/72    Pulse Readings from Last 3 Encounters:   18 72   02/03/15 68      Resp Readings from Last 3 Encounters:   02/03/15 16    SpO2 Readings from Last 3 Encounters:   02/03/15 99%      Temp Readings from Last 1 Encounters:   18 36.8  C (98.2  F) (Tympanic)    Ht Readings from Last 1 Encounters:   18 1.664 m (5' 5.5\")      Wt Readings from Last 1 Encounters:   18 49.8 kg (109 lb 12.8 oz)    Estimated body mass index is 17.99 kg/m  as calculated from the following:    Height as of 18: 1.664 m (5' 5.5\").    Weight as of 18: 49.8 kg (109 lb 12.8 oz).       Anesthesia Plan      History & Physical Review  History and physical reviewed " and following examination; no interval change.    ASA Status:  2 .    NPO Status:  > 6 hours    Plan for General Maintenance will be TIVA.           Postoperative Care      Consents  Anesthetic plan, risks, benefits and alternatives discussed with:  Patient..                 LINA Moore CRNA

## 2019-12-19 ENCOUNTER — HOSPITAL ENCOUNTER (OUTPATIENT)
Facility: CLINIC | Age: 64
Discharge: HOME | End: 2019-12-19
Attending: SURGERY | Admitting: SURGERY
Payer: COMMERCIAL

## 2019-12-19 ENCOUNTER — ANESTHESIA (OUTPATIENT)
Dept: GASTROENTEROLOGY | Facility: CLINIC | Age: 64
End: 2019-12-19
Payer: COMMERCIAL

## 2019-12-19 VITALS
HEIGHT: 65 IN | HEART RATE: 59 BPM | BODY MASS INDEX: 18.16 KG/M2 | RESPIRATION RATE: 15 BRPM | TEMPERATURE: 97.7 F | SYSTOLIC BLOOD PRESSURE: 104 MMHG | OXYGEN SATURATION: 98 % | DIASTOLIC BLOOD PRESSURE: 65 MMHG | WEIGHT: 109 LBS

## 2019-12-19 LAB — COLONOSCOPY: NORMAL

## 2019-12-19 PROCEDURE — 25000125 ZZHC RX 250: Performed by: SURGERY

## 2019-12-19 PROCEDURE — 25000128 H RX IP 250 OP 636: Performed by: NURSE ANESTHETIST, CERTIFIED REGISTERED

## 2019-12-19 PROCEDURE — 37000008 ZZH ANESTHESIA TECHNICAL FEE, 1ST 30 MIN: Performed by: SURGERY

## 2019-12-19 PROCEDURE — 37000009 ZZH ANESTHESIA TECHNICAL FEE, EACH ADDTL 15 MIN: Performed by: SURGERY

## 2019-12-19 PROCEDURE — 25800030 ZZH RX IP 258 OP 636: Performed by: NURSE ANESTHETIST, CERTIFIED REGISTERED

## 2019-12-19 PROCEDURE — 88305 TISSUE EXAM BY PATHOLOGIST: CPT | Performed by: SURGERY

## 2019-12-19 PROCEDURE — 45385 COLONOSCOPY W/LESION REMOVAL: CPT | Performed by: SURGERY

## 2019-12-19 PROCEDURE — 88305 TISSUE EXAM BY PATHOLOGIST: CPT | Mod: 26,59 | Performed by: SURGERY

## 2019-12-19 PROCEDURE — 25000125 ZZHC RX 250: Performed by: NURSE ANESTHETIST, CERTIFIED REGISTERED

## 2019-12-19 PROCEDURE — 45385 COLONOSCOPY W/LESION REMOVAL: CPT | Mod: PT | Performed by: SURGERY

## 2019-12-19 RX ORDER — PROPOFOL 10 MG/ML
INJECTION, EMULSION INTRAVENOUS PRN
Status: DISCONTINUED | OUTPATIENT
Start: 2019-12-19 | End: 2019-12-19

## 2019-12-19 RX ORDER — LIDOCAINE 40 MG/G
CREAM TOPICAL
Status: DISCONTINUED | OUTPATIENT
Start: 2019-12-19 | End: 2019-12-19 | Stop reason: HOSPADM

## 2019-12-19 RX ORDER — SODIUM CHLORIDE, SODIUM LACTATE, POTASSIUM CHLORIDE, CALCIUM CHLORIDE 600; 310; 30; 20 MG/100ML; MG/100ML; MG/100ML; MG/100ML
INJECTION, SOLUTION INTRAVENOUS CONTINUOUS
Status: DISCONTINUED | OUTPATIENT
Start: 2019-12-19 | End: 2019-12-19 | Stop reason: HOSPADM

## 2019-12-19 RX ORDER — RALOXIFENE HYDROCHLORIDE 60 MG/1
60 TABLET, FILM COATED ORAL DAILY
COMMUNITY

## 2019-12-19 RX ORDER — ONDANSETRON 2 MG/ML
4 INJECTION INTRAMUSCULAR; INTRAVENOUS
Status: DISCONTINUED | OUTPATIENT
Start: 2019-12-19 | End: 2019-12-19 | Stop reason: HOSPADM

## 2019-12-19 RX ORDER — PROPOFOL 10 MG/ML
INJECTION, EMULSION INTRAVENOUS CONTINUOUS PRN
Status: DISCONTINUED | OUTPATIENT
Start: 2019-12-19 | End: 2019-12-19

## 2019-12-19 RX ADMIN — PROPOFOL 100 MG: 10 INJECTION, EMULSION INTRAVENOUS at 09:34

## 2019-12-19 RX ADMIN — PROPOFOL 200 MCG/KG/MIN: 10 INJECTION, EMULSION INTRAVENOUS at 09:34

## 2019-12-19 RX ADMIN — LIDOCAINE HYDROCHLORIDE 1 ML: 10 INJECTION, SOLUTION EPIDURAL; INFILTRATION; INTRACAUDAL; PERINEURAL at 09:04

## 2019-12-19 RX ADMIN — SODIUM CHLORIDE, POTASSIUM CHLORIDE, SODIUM LACTATE AND CALCIUM CHLORIDE: 600; 310; 30; 20 INJECTION, SOLUTION INTRAVENOUS at 09:04

## 2019-12-19 ASSESSMENT — MIFFLIN-ST. JEOR: SCORE: 1035.3

## 2019-12-19 NOTE — H&P
64 year old year old female here for colonoscopy for screening.    Patient Active Problem List   Diagnosis     History of basal cell carcinoma     Recurrent UTI     Ganglion cyst     Osteopenia       Past Medical History:   Diagnosis Date     Basal cell carcinoma        Past Surgical History:   Procedure Laterality Date     BIOPSY OF SKIN LESION       SURGICAL HISTORY OF -       vein ablation     SURGICAL HISTORY OF -   IN 30'S    LAPAROSCOPY FOR INFERTILITY     TONSILLECTOMY  child       [facility]    No current outpatient medications on file.       No Known Allergies    Pt reports that she has never smoked. She has never used smokeless tobacco. She reports current alcohol use. She reports that she does not use drugs.    Exam:  There were no vitals taken for this visit.    Awake, Alert OX3  Lungs - CTA bilaterally  CV - RRR, no murmurs, distal pulses intact  Abd - soft, non-distended, non-tender, +BS  Extr - No cyanosis or edema    A/P 64 year old year old female in need of colonoscopy for screening. Risks, benefits, alternatives, and complications were discussed including the possibility of perforation and the patient agreed to proceed    Kendall Rice MD

## 2019-12-19 NOTE — ANESTHESIA POSTPROCEDURE EVALUATION
Patient: Latonya Thrasher    Procedure(s):  COLONOSCOPY, FLEXIBLE, WITH LESION REMOVAL USING SNARE    Diagnosis:Special screening for malignant neoplasms, colon [Z12.11]  Diagnosis Additional Information: No value filed.    Anesthesia Type:  General    Note:  Anesthesia Post Evaluation    Patient location during evaluation: Bedside  Patient participation: Able to fully participate in evaluation  Level of consciousness: awake and alert  Pain management: adequate  Airway patency: patent  Cardiovascular status: acceptable  Respiratory status: acceptable  Hydration status: acceptable  PONV: none     Anesthetic complications: None          Last vitals:  Vitals:    12/19/19 0836   BP: 118/75   Resp: 16   Temp: 36.5  C (97.7  F)   SpO2: 100%         Electronically Signed By: Loi Varma CRNA, APRN CRNA  December 19, 2019  10:18 AM

## 2019-12-19 NOTE — BRIEF OP NOTE
[facility]   Brief Operative Note    Pre-operative diagnosis: Special screening for malignant neoplasms, colon [Z12.11]   Post-operative diagnosis polyp X 2, otherwise normal     Procedure: Procedure(s):  COLONOSCOPY, FLEXIBLE, WITH LESION REMOVAL USING SNARE   Surgeon(s): Surgeon(s) and Role:     * Kendall Rice MD - Primary   Estimated blood loss: * No values recorded between 12/19/2019 12:00 AM and 12/19/2019 10:18 AM *    Specimens: ID Type Source Tests Collected by Time Destination   A : at 25cm Polyp Colon SURGICAL PATHOLOGY EXAM Kendall Rice MD 12/19/2019 10:13 AM    B :  Polyp Large Intestine, Cecum SURGICAL PATHOLOGY EXAM Kendall Rice MD 12/19/2019 10:16 AM       Findings: 1. Polyps as outlined  2. Colon otherwise normal

## 2019-12-19 NOTE — ANESTHESIA CARE TRANSFER NOTE
Patient: Latonya Thrasher    Procedure(s):  COLONOSCOPY, FLEXIBLE, WITH LESION REMOVAL USING SNARE    Diagnosis: Special screening for malignant neoplasms, colon [Z12.11]  Diagnosis Additional Information: No value filed.    Anesthesia Type:   General     Note:  Airway :Nasal Cannula  Patient transferred to:Phase II  Handoff Report: Identifed the Patient, Identified the Reponsible Provider, Reviewed the pertinent medical history, Discussed the surgical course, Reviewed Intra-OP anesthesia mangement and issues during anesthesia, Set expectations for post-procedure period and Allowed opportunity for questions and acknowledgement of understanding      Vitals: (Last set prior to Anesthesia Care Transfer)    CRNA VITALS  12/19/2019 0947 - 12/19/2019 1018      12/19/2019             NIBP:  93/51    Pulse:  71    NIBP Mean:  70    SpO2:  98 %                Electronically Signed By: Loi Varma CRNA, APRN CRNA  December 19, 2019  10:18 AM

## 2019-12-23 LAB — COPATH REPORT: NORMAL

## 2020-04-03 ENCOUNTER — NURSE TRIAGE (OUTPATIENT)
Dept: FAMILY MEDICINE | Facility: CLINIC | Age: 65
End: 2020-04-03

## 2020-04-03 ENCOUNTER — APPOINTMENT (OUTPATIENT)
Dept: GENERAL RADIOLOGY | Facility: CLINIC | Age: 65
End: 2020-04-03
Attending: STUDENT IN AN ORGANIZED HEALTH CARE EDUCATION/TRAINING PROGRAM
Payer: COMMERCIAL

## 2020-04-03 ENCOUNTER — HOSPITAL ENCOUNTER (EMERGENCY)
Facility: CLINIC | Age: 65
Discharge: HOME | End: 2020-04-03
Attending: STUDENT IN AN ORGANIZED HEALTH CARE EDUCATION/TRAINING PROGRAM | Admitting: STUDENT IN AN ORGANIZED HEALTH CARE EDUCATION/TRAINING PROGRAM
Payer: COMMERCIAL

## 2020-04-03 VITALS
WEIGHT: 110 LBS | SYSTOLIC BLOOD PRESSURE: 130 MMHG | DIASTOLIC BLOOD PRESSURE: 77 MMHG | OXYGEN SATURATION: 97 % | BODY MASS INDEX: 18.3 KG/M2 | TEMPERATURE: 97.5 F | RESPIRATION RATE: 15 BRPM
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
## 2020-04-03 DIAGNOSIS — S52.021A CLOSED FRACTURE OF RIGHT OLECRANON PROCESS, INITIAL ENCOUNTER: ICD-10-CM

## 2020-04-03 DIAGNOSIS — S09.90XA CLOSED HEAD INJURY, INITIAL ENCOUNTER: ICD-10-CM

## 2020-04-03 PROCEDURE — 73070 X-RAY EXAM OF ELBOW: CPT | Mod: RT

## 2020-04-03 PROCEDURE — 99284 EMERGENCY DEPT VISIT MOD MDM: CPT | Mod: 57 | Performed by: STUDENT IN AN ORGANIZED HEALTH CARE EDUCATION/TRAINING PROGRAM

## 2020-04-03 PROCEDURE — 24670 CLTX ULNAR FX PROX W/O MNPJ: CPT | Mod: 54 | Performed by: STUDENT IN AN ORGANIZED HEALTH CARE EDUCATION/TRAINING PROGRAM

## 2020-04-03 PROCEDURE — 24670 CLTX ULNAR FX PROX W/O MNPJ: CPT | Mod: RT | Performed by: STUDENT IN AN ORGANIZED HEALTH CARE EDUCATION/TRAINING PROGRAM

## 2020-04-03 PROCEDURE — 99284 EMERGENCY DEPT VISIT MOD MDM: CPT | Mod: 25 | Performed by: STUDENT IN AN ORGANIZED HEALTH CARE EDUCATION/TRAINING PROGRAM

## 2020-04-03 RX ORDER — OXYCODONE HYDROCHLORIDE 5 MG/1
5 TABLET ORAL EVERY 6 HOURS PRN
Qty: 8 TABLET | Refills: 0 | Status: SHIPPED | OUTPATIENT
Start: 2020-04-03

## 2020-04-03 NOTE — ED NOTES
Assisted pt with ring removal per pt request. Sling applied, difficult placement due to angle of splint. Discharge instructions reviewed, all questions answered.

## 2020-04-03 NOTE — TELEPHONE ENCOUNTER
Patient walked into clinic with .  Asking to be seen after fall while walking today.  EMS called to scene was told her vitals were stable and advised to seek eval at ,ED.  Patient went to [facility] it was closed so she walked into clinic.  Reports hitting back of her head hard, painful, feeling woozy. Denies loss of consciousness.  Right elbow pain, limited ROM.  Advised ED assessment. Patient prefers [place] ED.  Call placed to ED informed of above.  Call placed to patient to inform of current policy, no visitors allowed in hospital,patient verbalizes understanding.  Charlotte Styles RN

## 2020-04-03 NOTE — ED PROVIDER NOTES
History     Chief Complaint   Patient presents with     Injury     right elbow and back of head, from fall on ice this morning, no blood tinners     HPI  Latonya Thrasher is a 65 year old female who presents for evaluation of right elbow pain after fall which occurred at 8:30 AM.  Patient explains that she slipped and fell while walking around [place], fell backwards onto her right elbow but also struck the back of her head.  She was wearing a thick winter hat and denies significant closed head injury, loss of consciousness, confusion, neck pain, back pain, or other injuries.  She has been ambulatory since the incident but primary concern is with regards to the right elbow pain.    Allergies:  No Known Allergies    Problem List:    Patient Active Problem List    Diagnosis Date Noted     Osteopenia 02/10/2015     Priority: Medium     History of basal cell carcinoma 2015     Priority: Medium     Chest and face  [facility]       Recurrent UTI 2015     Priority: Medium     Ganglion cyst 2015     Priority: Medium        Past Medical History:    Past Medical History:   Diagnosis Date     Basal cell carcinoma        Past Surgical History:    Past Surgical History:   Procedure Laterality Date     BIOPSY OF SKIN LESION       SURGICAL HISTORY OF -       vein ablation     SURGICAL HISTORY OF -   IN S    LAPAROSCOPY FOR INFERTILITY     TONSILLECTOMY  child       Family History:    Family History   Problem Relation Age of Onset     Dementia Mother      Heart Disease Father          age 90 from valve issue     Hypertension Father      Lipids Father      Hypertension Sister      Hypertension Sister        Social History:  Marital Status:  Single [1]  Social History     Tobacco Use     Smoking status: Never Smoker     Smokeless tobacco: Never Used   Substance Use Topics     Alcohol use: Yes     Comment: 3-4 weekly     Drug use: No        Medications:    oxyCODONE (ROXICODONE) 5 MG  tablet  BIOTIN PO  calcium carbonate (OS-ALONZO 600 MG [place]. [place]) 600 MG TABS tablet  Cholecalciferol (VITAMIN D PO)  Glucos-Chondroit-Hyaluron-MSM (GLUCOSAMINE CHONDROITIN JOINT PO)  Multiple Vitamin (DAILY MULTIVITAMIN PO)  Omega-3 Fatty Acids (OMEGA-3 FISH OIL PO)  raloxifene (EVISTA) 60 MG tablet  tiZANidine (ZANAFLEX) 2 MG tablet          Review of Systems  Constitutional:  Negative for fever or illness.  Cardiovascular:  Negative for chest discomfort.  Respiratory:  Negative for cough.  Gastrointestinal:  Negative for abdominal pain, nausea or vomiting.  Musculoskeletal:  Positive for right elbow pain.  Negative for neck or back pain.  Neurological:  Negative for headache or dizziness.    All others reviewed and are negative.      Physical Exam   BP: 130/77  Heart Rate: 68  Temp: 97.5  F (36.4  C)  Resp: 15  Weight: 49.9 kg (110 lb)  SpO2: 97 %      Physical Exam  Constitutional:  Well developed, well nourished.  Appears stable and in no acute distress.  Head:  Atraumatic appearance of face.  Negative for Raccoon eyes and [place] sign.  No tenderness of skull.  Eye:  No proptosis or subconjunctival hemorrhage.  Eyelids appear symmetrical.  PERRLA.  Ears:  External auditory canals without blood or discharge, tympanic membranes without hemotympanum.  No mastoid region tenderness.  Neck:  No tenderness of midline cervical vertebra.  Ranging neck freely without being held in self-protecting position.  Cervical collar in place.   Cardiovascular:  No cyanosis.    Respiratory/chest:  Effort normal, no respiratory distress.    Musculoskeletal:  No extremity deformities.  No hip tenderness or pelvic instability.  No step-offs and no tenderness to palpation of midline thoracic or lumbosacral vertebra.  Right olecranon region swelling and tenderness.  Negative for tenderness of right shoulder, humerus, supracondylar region, forearm, wrist or hand.  Neuro:  Patient is alert and verbally responsive.   Skin:  Skin is warm and  dry, not diaphoretic.  No abrasions, contusions, ecchymosis, or lacerations.  Psych:  Normal mood and affect, not overly anxious or clinically intoxicated.      ED Course        Procedures                   No results found for this or any previous visit (from the past 24 hour(s)).    Medications - No data to display    Assessments & Plan (with Medical Decision Making)   Latonya Thrasher is a 65 year old female who presents to the emergency department for evaluation of right elbow pain after fall occurring several hours prior to arrival.  Although she struck her head, she reports that the fall was witnessed and no loss of consciousness or significant neurologic symptoms afterwards.  Based on her symptoms and the clinical examination, there is no evidence to suggest deformity, skin injury, tendon rupture, or neurovascular deficits.  Radiographic imaging was independently reviewed and olecranon fracture has been identified.  Radiologist read is consistent with findings.  On-call [facility] orthopedist Dr. Hu was consulted, reviewed imaging and recommended long-arm splint.  She plans to arrange to have patient follow-up for surgical correction early next week.  The patient was provided a prescription for short course of oxycodone to take only as absolutely necessary for breakthrough pain.          Disclaimer:  This note consists of symbols derived from keyboarding, dictation, and/or voice recognition software.  As a result, there may be errors in the script that have gone undetected.  Please consider this when interpreting information found in the chart.         I have reviewed the nursing notes.    I have reviewed the findings, diagnosis, plan and need for follow up with the patient.      Discharge Medication List as of 4/3/2020  1:28 PM      START taking these medications    Details   oxyCODONE (ROXICODONE) 5 MG tablet Take 1 tablet (5 mg) by mouth every 6 hours as needed for severe pain, Disp-8 tablet,R-0,  Local Print             Final diagnoses:   Closed fracture of right olecranon process, initial encounter   Closed head injury, initial encounter   Fall, initial encounter       4/3/2020   [facility] EMERGENCY DEPARTMENT     Dung Delacruz DO  04/04/20 1941

## 2020-04-03 NOTE — ED NOTES
Pt comes in after a fall this am. Out for her normal walk and slipped and fell. Ended up hitting her head, and landed on right elbow.  saw her fall, EMS was called. EMS evaluated and cleared to walk into Urgent Care, but was closed and referred here. Pt has not taken any pain medications or tylenol or ibuprofen today. No blood thinners, Denies headache, no nausea or vomiting. Neuro intact. Pt in room, oriented to room and call light. Call light within reach.

## 2020-04-03 NOTE — ED AVS SNAPSHOT
[facility] Emergency Department  [address] [address]  Phone:  [phone]  Fax:  [phone]                                    Latonya Thrasher   MRN: [id number]    Department:  [facility] Emergency Department   Date of Visit:  4/3/2020           After Visit Summary Signature Page    I have received my discharge instructions, and my questions have been answered. I have discussed any challenges I see with this plan with the nurse or doctor.    ..........................................................................................................................................  Patient/Patient Representative Signature      ..........................................................................................................................................  Patient Representative Print Name and Relationship to Patient    ..................................................               ................................................  Date                                   Time    ..........................................................................................................................................  Reviewed by Signature/Title    ...................................................              ..............................................  Date                                               Time          22EPIC Rev 08/18

## 2020-04-06 ENCOUNTER — TRANSFERRED RECORDS (OUTPATIENT)
Dept: HEALTH INFORMATION MANAGEMENT | Facility: CLINIC | Age: 65
End: 2020-04-06

## 2020-04-16 ENCOUNTER — TRANSFERRED RECORDS (OUTPATIENT)
Dept: HEALTH INFORMATION MANAGEMENT | Facility: CLINIC | Age: 65
End: 2020-04-16

## 2020-05-14 ENCOUNTER — TRANSFERRED RECORDS (OUTPATIENT)
Dept: HEALTH INFORMATION MANAGEMENT | Facility: CLINIC | Age: 65
End: 2020-05-14

## 2020-08-31 ENCOUNTER — TRANSFERRED RECORDS (OUTPATIENT)
Dept: HEALTH INFORMATION MANAGEMENT | Facility: CLINIC | Age: 65
End: 2020-08-31

## 2020-10-05 ENCOUNTER — TRANSFERRED RECORDS (OUTPATIENT)
Dept: HEALTH INFORMATION MANAGEMENT | Facility: CLINIC | Age: 65
End: 2020-10-05

## 2021-01-15 ENCOUNTER — HEALTH MAINTENANCE LETTER (OUTPATIENT)
Age: 66
End: 2021-01-15

## 2021-09-04 ENCOUNTER — HEALTH MAINTENANCE LETTER (OUTPATIENT)
Age: 66
End: 2021-09-04

## 2022-02-19 ENCOUNTER — HEALTH MAINTENANCE LETTER (OUTPATIENT)
Age: 67
End: 2022-02-19

## 2022-09-22 ENCOUNTER — LAB REQUISITION (OUTPATIENT)
Dept: LAB | Facility: CLINIC | Age: 67
End: 2022-09-22
Payer: COMMERCIAL

## 2022-09-22 DIAGNOSIS — Z13.1 ENCOUNTER FOR SCREENING FOR DIABETES MELLITUS: ICD-10-CM

## 2022-09-22 LAB — HBA1C MFR BLD: 5.9 %

## 2022-09-22 PROCEDURE — 83036 HEMOGLOBIN GLYCOSYLATED A1C: CPT | Mod: ORL | Performed by: OBSTETRICS & GYNECOLOGY

## 2022-10-22 ENCOUNTER — HEALTH MAINTENANCE LETTER (OUTPATIENT)
Age: 67
End: 2022-10-22

## 2023-04-01 ENCOUNTER — HEALTH MAINTENANCE LETTER (OUTPATIENT)
Age: 68
End: 2023-04-01

## 2023-06-01 ENCOUNTER — HEALTH MAINTENANCE LETTER (OUTPATIENT)
Age: 68
End: 2023-06-01

## 2023-06-08 ENCOUNTER — LAB REQUISITION (OUTPATIENT)
Dept: LAB | Facility: CLINIC | Age: 68
End: 2023-06-08

## 2023-06-08 DIAGNOSIS — R73.03 PREDIABETES: ICD-10-CM

## 2023-06-08 LAB — HBA1C MFR BLD: 5.6 %

## 2023-06-08 PROCEDURE — 83036 HEMOGLOBIN GLYCOSYLATED A1C: CPT | Performed by: OBSTETRICS & GYNECOLOGY

## 2023-07-20 ENCOUNTER — TRANSFERRED RECORDS (OUTPATIENT)
Dept: HEALTH INFORMATION MANAGEMENT | Facility: CLINIC | Age: 68
End: 2023-07-20

## 2024-05-09 ENCOUNTER — PATIENT OUTREACH (OUTPATIENT)
Dept: CARE COORDINATION | Facility: CLINIC | Age: 69
End: 2024-05-09
Payer: COMMERCIAL

## 2024-05-23 ENCOUNTER — PATIENT OUTREACH (OUTPATIENT)
Dept: CARE COORDINATION | Facility: CLINIC | Age: 69
End: 2024-05-23
Payer: COMMERCIAL

## 2024-08-11 ENCOUNTER — HEALTH MAINTENANCE LETTER (OUTPATIENT)
Age: 69
End: 2024-08-11

## 2024-09-17 ENCOUNTER — TRANSFERRED RECORDS (OUTPATIENT)
Dept: HEALTH INFORMATION MANAGEMENT | Facility: CLINIC | Age: 69
End: 2024-09-17

## 2024-09-17 ENCOUNTER — LAB REQUISITION (OUTPATIENT)
Dept: LAB | Facility: CLINIC | Age: 69
End: 2024-09-17
Payer: COMMERCIAL

## 2024-09-17 DIAGNOSIS — Z13.220 ENCOUNTER FOR SCREENING FOR LIPOID DISORDERS: ICD-10-CM

## 2024-09-17 PROCEDURE — 80061 LIPID PANEL: CPT | Mod: ORL | Performed by: OBSTETRICS & GYNECOLOGY

## 2024-09-18 LAB
CHOLEST SERPL-MCNC: 203 MG/DL
FASTING STATUS PATIENT QL REPORTED: NO
HDLC SERPL-MCNC: 93 MG/DL
LDLC SERPL CALC-MCNC: 96 MG/DL
NONHDLC SERPL-MCNC: 110 MG/DL
TRIGL SERPL-MCNC: 68 MG/DL

## 2024-09-24 ENCOUNTER — TRANSCRIBE ORDERS (OUTPATIENT)
Dept: OTHER | Age: 69
End: 2024-09-24

## 2024-09-24 DIAGNOSIS — Z12.11 SPECIAL SCREENING FOR MALIGNANT NEOPLASMS, COLON: Primary | ICD-10-CM

## 2024-09-26 ENCOUNTER — TRANSFERRED RECORDS (OUTPATIENT)
Dept: HEALTH INFORMATION MANAGEMENT | Facility: CLINIC | Age: 69
End: 2024-09-26

## 2024-10-10 ENCOUNTER — TELEPHONE (OUTPATIENT)
Dept: GASTROENTEROLOGY | Facility: CLINIC | Age: 69
End: 2024-10-10
Payer: COMMERCIAL

## 2024-10-10 NOTE — TELEPHONE ENCOUNTER
"Endoscopy Scheduling Screen      What insurance is in the chart?  Other:  [facility]    Ordering/Referring Provider: Tamara Tolliver   (If ordering provider performs procedure, schedule with ordering provider unless otherwise instructed. )    BMI: There is no height or weight on file to calculate BMI.     Sedation Ordered  general anesthesia.   BMI<= 45 45 < BMI <= 48 48 < BMI < = 50  BMI > 50   No Restrictions No [facility]  No [facility]  [facility] with exceptions Hospital Only OR Only       Do you have a history of malignant hyperthermia?  No    (Females) Are you currently pregnant?   No     Have you been diagnosed or told you have pulmonary hypertension?   No    Do you have any heart conditions?  No     Do you have an LVAD?  No    Have you been told you have moderate to severe sleep apnea?  No.    Have you been told you have COPD, asthma, or any other lung disease?  No    Have you ever had or are you waiting for an organ transplant?  No. Continue scheduling, no site restrictions.    Have you had a stroke or transient ischemic attack (TIA aka \"mini stroke\" in the last 6 months?   No    Have you been diagnosed with or been told you have cirrhosis of the liver?   No.    Are you currently on dialysis?   No    Do you need assistance transferring?   No    BMI: There is no height or weight on file to calculate BMI.     Is patients BMI > 50?  No    BMI > 40?  No    Do you have a diagnosis of diabetes?  No    Do you take an injectable medication for weight loss or diabetes (excluding insulin)?  No    Do you take the medication Naltrexone?  No    Do you take blood thinners?  No     Prep   Are you currently on dialysis or do you have chronic kidney disease?  No    Do you have a diagnosis of cystic fibrosis (CF)?  No    On a regular basis do you go 3 -5 days between bowel movements?  No    Preferred Pharmacy:  [facility] [employer] [facility] [address]  No Pharmacies Listed    Final Scheduling Details     Procedure " scheduled  Colonoscopy    Surgeon:  Charu     Date of procedure:  1-6-25     Location  [place] - Patient preference.    What is your communication preference for Instructions and/or Bowel Prep?   Email [email or web address]    Patient Reminders:    You will receive a call from a Nurse to review instructions and health history.  This assessment must be completed prior to your procedure.  Failure to complete the Nurse assessment may result in the procedure being cancelled.       On the day of your procedure, please designate an adult(s) who can drive you home stay with you for the next 24 hours. The medicines used in the exam will make you sleepy. You will not be able to drive.       You cannot take public transportation, ride share services, or non-medical taxi service without a responsible caregiver.  Medical transport services are allowed with the requirement that a responsible caregiver will receive you at your destination.  We require that drivers and caregivers are confirmed prior to your procedure.

## 2024-12-05 ENCOUNTER — PATIENT OUTREACH (OUTPATIENT)
Dept: CARE COORDINATION | Facility: CLINIC | Age: 69
End: 2024-12-05
Payer: COMMERCIAL

## 2024-12-20 PROBLEM — M81.0 OSTEOPOROSIS: Status: ACTIVE | Noted: 2024-12-20

## 2024-12-20 NOTE — H&P (VIEW-ONLY)
"  Assessment & Plan     Age-related osteoporosis without current pathological fracture  Has diagnosis of osteoporosis, was taking fosamax, had to stop taking 3-5 years ago because she developed jaw pain. Pt is interested in starting prolia injections. Referral placed for endocrinology. Advised patient to get on the waitlist for endocrinology to possibly be seen sooner.           David Hanks is a 69 year old, presenting for the following health issues:  Referral      12/20/2024     7:37 AM   Additional Questions   Roomed by Caroline FORDE   Accompanied by self     History of Present Illness       Reason for visit:  Want medication for osteoporosis and possible teferral to an endocrinologist for med and monitoring    She eats 2-3 servings of fruits and vegetables daily.She consumes 0 sweetened beverage(s) daily.She exercises with enough effort to increase her heart rate 30 to 60 minutes per day.  She exercises with enough effort to increase her heart rate 7 days per week.   She is taking medications regularly.       Pt has hx of osteoporosis, most recent dexa in 2022. She was taking fosamax in 2018, had to go off of it due to jaw pain. Pt has been treating with calcium, vitamin d, and reloxifene, but is interested in prolia injections. Needs referral to endo. No signs of pathological fracture..       Review of Systems  Constitutional, HEENT, cardiovascular, pulmonary, GI, , musculoskeletal, neuro, skin, endocrine and psych systems are negative, except as otherwise noted.      Objective    /78   Pulse 77   Temp 98  F (36.7  C) (Tympanic)   Resp 16   Ht 1.651 m (5' 5\")   Wt 49.6 kg (109 lb 4.8 oz)   SpO2 98%   BMI 18.19 kg/m    Body mass index is 18.19 kg/m .  Physical Exam   GENERAL: alert and no distress  MS: no gross musculoskeletal defects noted, no edema  PSYCH: mentation appears normal, affect normal/bright          Signed Electronically by: LINA Richardson CNP    "

## 2025-01-03 ENCOUNTER — ANESTHESIA EVENT (OUTPATIENT)
Dept: GASTROENTEROLOGY | Facility: CLINIC | Age: 70
End: 2025-01-03
Payer: COMMERCIAL

## 2025-01-03 RX ORDER — SODIUM CHLORIDE, SODIUM LACTATE, POTASSIUM CHLORIDE, CALCIUM CHLORIDE 600; 310; 30; 20 MG/100ML; MG/100ML; MG/100ML; MG/100ML
INJECTION, SOLUTION INTRAVENOUS CONTINUOUS
Status: CANCELLED | OUTPATIENT
Start: 2025-01-03

## 2025-01-03 NOTE — ANESTHESIA PREPROCEDURE EVALUATION
"Anesthesia Pre-Procedure Evaluation    Patient: Latonya Thrasher   MRN: [id number] : 1955        Procedure : Procedure(s):  Colonoscopy          Past Medical History:   Diagnosis Date    Basal cell carcinoma       Past Surgical History:   Procedure Laterality Date    BIOPSY OF SKIN LESION      SURGICAL HISTORY OF -       vein ablation    SURGICAL HISTORY OF -   IN 30'S    LAPAROSCOPY FOR INFERTILITY    TONSILLECTOMY  child      No Known Allergies   Social History     Tobacco Use    Smoking status: Never    Smokeless tobacco: Never   Substance Use Topics    Alcohol use: Yes     Comment: 3-4 weekly      Wt Readings from Last 1 Encounters:   24 49.6 kg (109 lb 4.8 oz)        Anesthesia Evaluation   Pt has had prior anesthetic.         ROS/MED HX  ENT/Pulmonary:  - neg pulmonary ROS     Neurologic:  - neg neurologic ROS     Cardiovascular:  - neg cardiovascular ROS     METS/Exercise Tolerance:     Hematologic:  - neg hematologic  ROS     Musculoskeletal:  - neg musculoskeletal ROS     GI/Hepatic:  - neg GI/hepatic ROS     Renal/Genitourinary:  - neg Renal ROS     Endo:  - neg endo ROS     Psychiatric/Substance Use:  - neg psychiatric ROS     Infectious Disease:  - neg infectious disease ROS     Malignancy:  - neg malignancy ROS     Other:  - neg other ROS          Physical Exam    Airway        Mallampati: I   TM distance: > 3 FB   Neck ROM: full   Mouth opening: > 3 cm    Respiratory Devices and Support         Dental       (+) Minor Abnormalities - some fillings, tiny chips      Cardiovascular   cardiovascular exam normal          Pulmonary   pulmonary exam normal                OUTSIDE LABS:  CBC: No results found for: \"WBC\", \"HGB\", \"HCT\", \"PLT\"  BMP:   Lab Results   Component Value Date    GLC 86 2017     COAGS: No results found for: \"PTT\", \"INR\", \"FIBR\"  POC: No results found for: \"BGM\", \"HCG\", \"HCGS\"  HEPATIC: No results found for: \"ALBUMIN\", \"PROTTOTAL\", \"ALT\", \"AST\", \"GGT\", \"ALKPHOS\", " "\"BILITOTAL\", \"BILIDIRECT\", \"INEZ\"  OTHER:   Lab Results   Component Value Date    A1C 5.6 06/08/2023       Anesthesia Plan    ASA Status:  2       Anesthesia Type: General.              Consents    Anesthesia Plan(s) and associated risks, benefits, and realistic alternatives discussed. Questions answered and patient/representative(s) expressed understanding.     - Discussed: Risks, Benefits and Alternatives for BOTH SEDATION and the PROCEDURE were discussed     - Discussed with:  Patient            Postoperative Care            Comments:               LINA Ravi CRNA    I have reviewed the pertinent notes and labs in the chart from the past 30 days and (re)examined the patient.  Any updates or changes from those notes are reflected in this note.                                   "

## 2025-01-06 ENCOUNTER — HOSPITAL ENCOUNTER (OUTPATIENT)
Facility: CLINIC | Age: 70
Discharge: HOME | End: 2025-01-06
Attending: SURGERY | Admitting: SURGERY
Payer: COMMERCIAL

## 2025-01-06 ENCOUNTER — ANESTHESIA (OUTPATIENT)
Dept: GASTROENTEROLOGY | Facility: CLINIC | Age: 70
End: 2025-01-06
Payer: COMMERCIAL

## 2025-01-06 VITALS
HEART RATE: 93 BPM | SYSTOLIC BLOOD PRESSURE: 90 MMHG | OXYGEN SATURATION: 98 % | HEIGHT: 65 IN | TEMPERATURE: 98.3 F | RESPIRATION RATE: 16 BRPM | WEIGHT: 109.3 LBS | BODY MASS INDEX: 18.21 KG/M2 | DIASTOLIC BLOOD PRESSURE: 60 MMHG

## 2025-01-06 PROBLEM — D12.6 COLON ADENOMA: Status: ACTIVE | Noted: 2025-01-06

## 2025-01-06 LAB — COLONOSCOPY: NORMAL

## 2025-01-06 PROCEDURE — 250N000011 HC RX IP 250 OP 636: Performed by: NURSE ANESTHETIST, CERTIFIED REGISTERED

## 2025-01-06 PROCEDURE — 370N000017 HC ANESTHESIA TECHNICAL FEE, PER MIN: Performed by: SURGERY

## 2025-01-06 PROCEDURE — 45378 DIAGNOSTIC COLONOSCOPY: CPT | Performed by: SURGERY

## 2025-01-06 PROCEDURE — G0105 COLORECTAL SCRN; HI RISK IND: HCPCS | Performed by: SURGERY

## 2025-01-06 RX ORDER — NALOXONE HYDROCHLORIDE 0.4 MG/ML
0.1 INJECTION, SOLUTION INTRAMUSCULAR; INTRAVENOUS; SUBCUTANEOUS
Status: DISCONTINUED | OUTPATIENT
Start: 2025-01-06 | End: 2025-01-06 | Stop reason: HOSPADM

## 2025-01-06 RX ORDER — OXYCODONE HYDROCHLORIDE 5 MG/1
10 TABLET ORAL
Status: DISCONTINUED | OUTPATIENT
Start: 2025-01-06 | End: 2025-01-06 | Stop reason: HOSPADM

## 2025-01-06 RX ORDER — PROPOFOL 10 MG/ML
INJECTION, EMULSION INTRAVENOUS PRN
Status: DISCONTINUED | OUTPATIENT
Start: 2025-01-06 | End: 2025-01-06

## 2025-01-06 RX ORDER — ACETAMINOPHEN 325 MG/1
975 TABLET ORAL ONCE
Status: DISCONTINUED | OUTPATIENT
Start: 2025-01-06 | End: 2025-01-06 | Stop reason: HOSPADM

## 2025-01-06 RX ORDER — DEXAMETHASONE SODIUM PHOSPHATE 4 MG/ML
4 INJECTION, SOLUTION INTRA-ARTICULAR; INTRALESIONAL; INTRAMUSCULAR; INTRAVENOUS; SOFT TISSUE
Status: DISCONTINUED | OUTPATIENT
Start: 2025-01-06 | End: 2025-01-06 | Stop reason: HOSPADM

## 2025-01-06 RX ORDER — GABAPENTIN 100 MG/1
100 CAPSULE ORAL
Status: DISCONTINUED | OUTPATIENT
Start: 2025-01-06 | End: 2025-01-06 | Stop reason: HOSPADM

## 2025-01-06 RX ORDER — LIDOCAINE 40 MG/G
CREAM TOPICAL
Status: DISCONTINUED | OUTPATIENT
Start: 2025-01-06 | End: 2025-01-06 | Stop reason: HOSPADM

## 2025-01-06 RX ORDER — ONDANSETRON 4 MG/1
4 TABLET, ORALLY DISINTEGRATING ORAL EVERY 30 MIN PRN
Status: DISCONTINUED | OUTPATIENT
Start: 2025-01-06 | End: 2025-01-06 | Stop reason: HOSPADM

## 2025-01-06 RX ORDER — OXYCODONE HYDROCHLORIDE 5 MG/1
5 TABLET ORAL
Status: DISCONTINUED | OUTPATIENT
Start: 2025-01-06 | End: 2025-01-06 | Stop reason: HOSPADM

## 2025-01-06 RX ORDER — GLYCOPYRROLATE 0.2 MG/ML
INJECTION, SOLUTION INTRAMUSCULAR; INTRAVENOUS PRN
Status: DISCONTINUED | OUTPATIENT
Start: 2025-01-06 | End: 2025-01-06

## 2025-01-06 RX ORDER — ONDANSETRON 2 MG/ML
4 INJECTION INTRAMUSCULAR; INTRAVENOUS EVERY 30 MIN PRN
Status: DISCONTINUED | OUTPATIENT
Start: 2025-01-06 | End: 2025-01-06 | Stop reason: HOSPADM

## 2025-01-06 RX ADMIN — GLYCOPYRROLATE 0.2 MG: 0.2 INJECTION, SOLUTION INTRAMUSCULAR; INTRAVENOUS at 08:02

## 2025-01-06 RX ADMIN — PROPOFOL 50 MG: 10 INJECTION, EMULSION INTRAVENOUS at 08:05

## 2025-01-06 RX ADMIN — PROPOFOL 150 MG: 10 INJECTION, EMULSION INTRAVENOUS at 08:03

## 2025-01-06 RX ADMIN — PROPOFOL 25 MG: 10 INJECTION, EMULSION INTRAVENOUS at 08:10

## 2025-01-06 ASSESSMENT — ACTIVITIES OF DAILY LIVING (ADL)
ADLS_ACUITY_SCORE: 41

## 2025-01-06 NOTE — ANESTHESIA POSTPROCEDURE EVALUATION
Patient: Latonya Thrasher    Procedure: Procedure(s):  Colonoscopy       Anesthesia Type:  General    Note:  Disposition: Outpatient   Postop Pain Control: Uneventful            Sign Out: Well controlled pain   PONV: No   Neuro/Psych: Uneventful            Sign Out: Acceptable/Baseline neuro status   Airway/Respiratory: Uneventful            Sign Out: Acceptable/Baseline resp. status   CV/Hemodynamics: Uneventful            Sign Out: Acceptable CV status; No obvious hypovolemia; No obvious fluid overload   Other NRE: NONE   DID A NON-ROUTINE EVENT OCCUR? No           Last vitals:  Vitals Value Taken Time   /88 01/06/25 0845   Temp 36.8  C (98.3  F) 01/06/25 0840   Pulse 75 01/06/25 0845   Resp 16 01/06/25 0840   SpO2 98 % 01/06/25 0840   Vitals shown include unfiled device data.    Electronically Signed By: LINA Galvan CRNA  January 6, 2025  8:52 AM

## 2025-01-06 NOTE — ANESTHESIA CARE TRANSFER NOTE
Patient: Latonya Thrasher    Procedure: Procedure(s):  Colonoscopy       Diagnosis: Special screening for malignant neoplasm of colon [Z12.11]  Diagnosis Additional Information: No value filed.    Anesthesia Type:   General     Note:    Oropharynx: oropharynx clear of all foreign objects and spontaneously breathing  Level of Consciousness: awake  Oxygen Supplementation: room air    Independent Airway: airway patency satisfactory and stable  Dentition: dentition unchanged  Vital Signs Stable: post-procedure vital signs reviewed and stable  Report to RN Given: handoff report given  Patient transferred to: Phase II    Handoff Report: Identifed the Patient, Identified the Reponsible Provider, Reviewed the pertinent medical history, Discussed the surgical course, Reviewed Intra-OP anesthesia mangement and issues during anesthesia, Set expectations for post-procedure period and Allowed opportunity for questions and acknowledgement of understanding      Vitals:  Vitals Value Taken Time   BP     Temp     Pulse     Resp     SpO2         Electronically Signed By: LINA Galvan CRNA  January 6, 2025  8:24 AM

## 2025-01-06 NOTE — PROGRESS NOTES
[place] [facility] DISCHARGE NOTE    Patient discharged to home at 9:02 AM via ambulation. Accompanied by spouse and staff. Discharge instructions reviewed with patient and spouse, opportunity offered to ask questions. Prescriptions - None ordered for discharge. All belongings sent with patient.    Judy Washington RN

## 2025-04-10 ENCOUNTER — PATIENT OUTREACH (OUTPATIENT)
Dept: FAMILY MEDICINE | Facility: CLINIC | Age: 70
End: 2025-04-10
Payer: COMMERCIAL

## 2025-04-10 NOTE — TELEPHONE ENCOUNTER
Patient Quality Outreach    Patient is due for the following:   Physical Annual Wellness Visit    Action(s) Taken:   Schedule a Annual Wellness Visit    Type of outreach:    Sent [employer] message.    Questions for provider review:    None         Cady Ramírez  Chart routed to None.

## 2025-05-04 ENCOUNTER — HEALTH MAINTENANCE LETTER (OUTPATIENT)
Age: 70
End: 2025-05-04

## 2025-07-23 ENCOUNTER — TELEPHONE (OUTPATIENT)
Dept: ENDOCRINOLOGY | Facility: CLINIC | Age: 70
End: 2025-07-23

## 2025-07-23 ENCOUNTER — VIRTUAL VISIT (OUTPATIENT)
Dept: ENDOCRINOLOGY | Facility: CLINIC | Age: 70
End: 2025-07-23
Payer: COMMERCIAL

## 2025-07-23 DIAGNOSIS — M81.8 OTHER OSTEOPOROSIS WITHOUT CURRENT PATHOLOGICAL FRACTURE: ICD-10-CM

## 2025-07-23 DIAGNOSIS — M81.0 AGE-RELATED OSTEOPOROSIS WITHOUT CURRENT PATHOLOGICAL FRACTURE: Primary | ICD-10-CM

## 2025-07-23 PROCEDURE — 98002 SYNCH AUDIO-VIDEO NEW MOD 45: CPT | Performed by: STUDENT IN AN ORGANIZED HEALTH CARE EDUCATION/TRAINING PROGRAM

## 2025-07-23 PROCEDURE — G2211 COMPLEX E/M VISIT ADD ON: HCPCS | Performed by: STUDENT IN AN ORGANIZED HEALTH CARE EDUCATION/TRAINING PROGRAM

## 2025-07-23 PROCEDURE — 1126F AMNT PAIN NOTED NONE PRSNT: CPT | Mod: 95 | Performed by: STUDENT IN AN ORGANIZED HEALTH CARE EDUCATION/TRAINING PROGRAM

## 2025-07-23 NOTE — PROGRESS NOTES
Endocrinology Clinic Visit 7/23/2025      Video-Visit Details    Type of service:  Video Visit  Joined the call at 7/23/2025, 10:10:48 am.  Left the call at 7/23/2025, 10:45:32 am.    Originating Location (pt. Location): Home        Distant Location (provider location):  Off-site    Mode of Communication:  Video Conference via [employer]    Physician has received verbal consent for a Video Visit from the patient? Yes    I spent a total of 50 minutes on the date of encounter reviewing medical records, evaluating the patient, coordinating care and documenting in the EHR, as detailed above.      NAME:  Latonya Thrasher  PCP:  Nancy Eddy  MRN:  [id number]  Reason for Consult:  oateoporosis  Requesting Provider:  Evelina Marcus    Chief Complaint     Chief Complaint   Patient presents with    Consult       History of Present Illness     Latonya Thrasher is a 70 year old female who is seen in video visit for osteoporosis.    She has no other pertinent PMH.  She reported she took fosamax 10 years ago for few month, had jaw pain. She was started  on raloxifene and remained on it.    The patient had a DXA scan in 2008, 2010, 2013, 2018 and most recent one 2022 , all done at an outside facility, 2022 dexa scan showed:   Lumbar spine T-score -2.7, previously -3.2.  Left hip T-score -2 previously -2.  VFA negative.  Since the prior exam BMD has increased by 1.2% at the left hip and 7.5% at the lumbar spine.    Calcium and Vitamin D intake:   Dietary: no milk, lot of cheese and yogurt.  Supplements: citracal with D3 650 mg with 25 mcg in addition to another 25 mcg of d3       Osteoporosis Risk factors:   Previous fractures: elbow fx after falling in 2020  Family history of fragility fracture in parent: hip fracture in mother  Sister also has OP  Current smoking: no  Steroid Use: no  Rheumatoid  arthritis: no  Alcohol (3 or more units per day): no  Reported loss of height: 1/2-1 inch shorter  Menstrual history: age at  menopause: early-mid 40s  No previous pregnancies  Estrogen use after menopause: yes for 5-6 years  Tendency for falls: no  GI history: Malabsorption (IBD, Celiac, gastric bypass ): no  History of kidney stones: no  History of thyroid disease: no  Physical activity: walking 2-4 miles every day , stretching.   Weight history: stable, no concerns.  S/p tooth extraction 6 month ago.      Problem List     Patient Active Problem List   Diagnosis    History of basal cell carcinoma    Recurrent UTI    Ganglion cyst    Osteoporosis    Colon adenoma        Medications     Current Outpatient Medications   Medication Sig Dispense Refill    BIOTIN PO Take 1 tablet by mouth daily       calcium carbonate (OS-ALONZO 600 MG [place] CA) 600 MG TABS tablet Take 1 Tab by mouth Once Daily.      Cholecalciferol (VITAMIN D PO) Take 1 tablet by mouth daily       Glucos-Chondroit-Hyaluron-MSM (GLUCOSAMINE CHONDROITIN JOINT PO)       Multiple Vitamin (DAILY MULTIVITAMIN PO) Take 1 tablet by mouth daily       raloxifene (EVISTA) 60 MG tablet Take 60 mg by mouth daily      tretinoin (RETIN-A) 0.05 % external cream APPLY TO FACE EVERY DAY NIGHTLY       No current facility-administered medications for this visit.        Allergies     No Known Allergies    Medical / Surgical History     Past Medical History:   Diagnosis Date    Basal cell carcinoma      Past Surgical History:   Procedure Laterality Date    BIOPSY OF SKIN LESION      COLONOSCOPY N/A 1/6/2025    Procedure: Colonoscopy;  Surgeon: Bob Box MD;  Location: [facility]    SURGICAL HISTORY OF -       vein ablation    SURGICAL HISTORY OF -   IN 30'S    LAPAROSCOPY FOR INFERTILITY    TONSILLECTOMY  child       Social History     Social History     Socioeconomic History    Marital status: Single     Spouse name: Not on file    Number of children: Not on file    Years of education: Not on file    Highest education level: Not on file   Occupational History    Not on file   Tobacco Use    Smoking  status: Never    Smokeless tobacco: Never   Substance and Sexual Activity    Alcohol use: Yes     Comment: 3-4 weekly    Drug use: No    Sexual activity: Yes     Partners: Male   Other Topics Concern    Parent/sibling w/ CABG, MI or angioplasty before 65F 55M? Not Asked   Social History Narrative    Not on file     Social Drivers of Health     Financial Resource Strain: Not on file   Food Insecurity: Not on file   Transportation Needs: Not on file   Physical Activity: Not on file   Stress: Not on file   Social Connections: Not on file   Interpersonal Safety: Low Risk  (2025)    Interpersonal Safety     Do you feel physically and emotionally safe where you currently live?: Yes     Within the past 12 months, have you been hit, slapped, kicked or otherwise physically hurt by someone?: No     Within the past 12 months, have you been humiliated or emotionally abused in other ways by your partner or ex-partner?: No   Housing Stability: Not on file       Family History     Family History   Problem Relation Age of Onset    Dementia Mother     Heart Disease Father          age 90 from valve issue    Hypertension Father     Lipids Father     Hypertension Sister     Hypertension Sister        ROS     12 ROS completed, pertinent positive and negative in HPI    Physical Exam   There were no vitals taken for this visit.   GENERAL: alert and no distress  EYES: Eyes grossly normal to inspection.  No discharge or erythema, or obvious scleral/conjunctival abnormalities.  RESP: No audible wheeze, cough, or visible cyanosis.    SKIN: Visible skin clear. No significant rash, abnormal pigmentation or lesions.  NEURO: Cranial nerves grossly intact.  Mentation and speech appropriate for age.  PSYCH: Appropriate affect, tone, and pace of words     Labs/Imaging     Pertinent Labs were reviewed and updated in EPIC and discussed briefly.  Radiology Results were  reviewed and updated in EPIC and discussed briefly.    Summary of recent  "findings:   Lab Results   Component Value Date    A1C 5.6 06/08/2023    A1C 5.9 09/22/2022       No results found for: \"TSH\", \"T4\"    No results found for: \"CR\"    Recent Labs   Lab Test 09/17/24  1656 06/14/17  0000   CHOL 203* 207*   HDL 93 85   LDL 96 102*   TRIG 68 102       No results found for: \"[id number]\", \"[id number]\", \"[id number]\"    I personally reviewed the patient's outside records from [employer] EMR, Care Everywhere, and faxed records. Summary of pertinent findings in HPI.    Impression / Plan     1.  Osteoporosis  Risk factors for bone loss include age, postmenopausal status, family history of osteoporosis with mother having hip fracture.  She did not tolerate Fosamax in the past due to jaw pain.  She has been on raloxifene for around 10 years.  Most recent DEXA scan in 2022 with some improvement in her BMD.  She has been following with regular DEXA scan by an outside facility.  She would like to switch care to [place].  We will plan to get a new baseline DEXA scan by [place] machine.  Will decide on further osteoporosis treatment based on DEXA scan result.  We briefly reviewed antiresorptive medication options including IV Reclast and Prolia.  I do not see any baseline labs and will get labs below.  She might be planning for more tooth extraction and dental implant in the near future, she has dentist follow-up in September.      Test and/or medications prescribed today:  Orders Placed This Encounter   Procedures    Comprehensive metabolic panel    TSH with free T4 reflex    25 Hydroxyvitamin D2 and D3         Follow up: In 3-month    The longitudinal plan of care for the diagnosis(es)/condition(s) as documented were addressed during this visit. Due to the added complexity in care, I will continue to support Latonya in the subsequent management and with ongoing continuity of care.       Aleyda Mo MD  Endocrinology, Diabetes and Metabolism  [facility]    "

## 2025-07-23 NOTE — TELEPHONE ENCOUNTER
Left Voicemail (1st Attempt) for the patient to call back and schedule the following:    Appointment type: return  Provider: dr. scott  Return date: 10/23/2025   Specialty phone number: [phone]  Additonal Notes:  Return in about 3 months (around 10/23/2025).     [facility]   Orthopedics, Podiatry, Sports Medicine, Ent ,Eye , Audiology, Adult Endocrine & Diabetes, Nutrition & Medication Therapy Management Specialties   [facility]

## 2025-07-23 NOTE — LETTER
7/23/2025      Latonya Thrasher  [address]  [facility] [address]      Dear Colleague,    Thank you for referring your patient, Latonya Thrasher, to the [facility]. Please see a copy of my visit note below.    Endocrinology Clinic Visit 7/23/2025      Video-Visit Details    Type of service:  Video Visit  Joined the call at 7/23/2025, 10:10:48 am.  Left the call at 7/23/2025, 10:45:32 am.    Originating Location (pt. Location): Home    {PROVIDER LOCATION On-site should be selected for visits conducted from your clinic location or adjoining [facility] hospital, academic office, or other nearby [facility] building. Off-site should be selected for all other provider locations, including home:[id number]}    Distant Location (provider location):  Off-site    Mode of Communication:  Video Conference via [facility]    Physician has received verbal consent for a Video Visit from the patient? Yes    I spent a total of 50 minutes on the date of encounter reviewing medical records, evaluating the patient, coordinating care and documenting in the EHR, as detailed above.      NAME:  Latonya Thrasher  PCP:  Nancy Eddy  MRN:  [id number]  Reason for Consult:  oateoporosis  Requesting Provider:  Evelina Marcus    Chief Complaint     Chief Complaint   Patient presents with     Consult       History of Present Illness     Latonya Thrasher is a 70 year old female who is seen in video visit for osteoporosis.    She has no other pertinent PMH.  She reported she took fosamax 10 years ago for few month, had jaw pain. She was started  on raloxifene and remained on it.    The patient had a DXA scan in 2008, 2010, 2013, 2018 and most recent one 2022 , all done at an outside facility, 2022 dexa scan showed:   Lumbar spine T-score -2.7, previously -3.2.  Left hip T-score -2 previously -2.  VFA negative.  Since the prior exam BMD has increased by 1.2% at the left hip and 7.5% at the lumbar spine.    Calcium and Vitamin D  intake:   Dietary: no milk, lot of cheese and yogurt.  Supplements: citracal with D3 650 mg with 25 mcg in addition to another 25 mcg of d3       Osteoporosis Risk factors:   Previous fractures: elbow fx after falling in 2020  Family history of fragility fracture in parent: hip fracture in mother  Sister also has OP  Current smoking: no  Steroid Use: no  Rheumatoid  arthritis: no  Alcohol (3 or more units per day): no  Reported loss of height: 1/2-1 inch shorter  Menstrual history: age at menopause: early-mid 40s  No previous pregnancies  Estrogen use after menopause: yes for 5-6 years  Tendency for falls: no  GI history: Malabsorption (IBD, Celiac, gastric bypass ): no  History of kidney stones: no  History of thyroid disease: no  Physical activity: walking 2-4 miles every day , stretching.   Weight history: stable, no concerns.  S/p tooth extraction 6 month ago.      Problem List     Patient Active Problem List   Diagnosis     History of basal cell carcinoma     Recurrent UTI     Ganglion cyst     Osteoporosis     Colon adenoma        Medications     Current Outpatient Medications   Medication Sig Dispense Refill     BIOTIN PO Take 1 tablet by mouth daily        calcium carbonate (OS-ALONZO 600 MG [place]. [place]) 600 MG TABS tablet Take 1 Tab by mouth Once Daily.       Cholecalciferol (VITAMIN D PO) Take 1 tablet by mouth daily        Glucos-Chondroit-Hyaluron-MSM (GLUCOSAMINE CHONDROITIN JOINT PO)        Multiple Vitamin (DAILY MULTIVITAMIN PO) Take 1 tablet by mouth daily        raloxifene (EVISTA) 60 MG tablet Take 60 mg by mouth daily       tretinoin (RETIN-A) 0.05 % external cream APPLY TO FACE EVERY DAY NIGHTLY       No current facility-administered medications for this visit.        Allergies     No Known Allergies    Medical / Surgical History     Past Medical History:   Diagnosis Date     Basal cell carcinoma      Past Surgical History:   Procedure Laterality Date     BIOPSY OF SKIN LESION       COLONOSCOPY N/A  2025    Procedure: Colonoscopy;  Surgeon: Bob Box MD;  Location: [facility]     SURGICAL HISTORY OF -       vein ablation     SURGICAL HISTORY OF -   IN 30'S    LAPAROSCOPY FOR INFERTILITY     TONSILLECTOMY  child       Social History     Social History     Socioeconomic History     Marital status: Single     Spouse name: Not on file     Number of children: Not on file     Years of education: Not on file     Highest education level: Not on file   Occupational History     Not on file   Tobacco Use     Smoking status: Never     Smokeless tobacco: Never   Substance and Sexual Activity     Alcohol use: Yes     Comment: 3-4 weekly     Drug use: No     Sexual activity: Yes     Partners: Male   Other Topics Concern     Parent/sibling w/ CABG, MI or angioplasty before 65F 55M? Not Asked   Social History Narrative     Not on file     Social Drivers of Health     Financial Resource Strain: Not on file   Food Insecurity: Not on file   Transportation Needs: Not on file   Physical Activity: Not on file   Stress: Not on file   Social Connections: Not on file   Interpersonal Safety: Low Risk  (2025)    Interpersonal Safety      Do you feel physically and emotionally safe where you currently live?: Yes      Within the past 12 months, have you been hit, slapped, kicked or otherwise physically hurt by someone?: No      Within the past 12 months, have you been humiliated or emotionally abused in other ways by your partner or ex-partner?: No   Housing Stability: Not on file       Family History     Family History   Problem Relation Age of Onset     Dementia Mother      Heart Disease Father          age 90 from valve issue     Hypertension Father      Lipids Father      Hypertension Sister      Hypertension Sister        ROS     12 ROS completed, pertinent positive and negative in HPI    Physical Exam   There were no vitals taken for this visit.   GENERAL: alert and no distress  EYES: Eyes grossly normal to inspection.  No  "discharge or erythema, or obvious scleral/conjunctival abnormalities.  RESP: No audible wheeze, cough, or visible cyanosis.    SKIN: Visible skin clear. No significant rash, abnormal pigmentation or lesions.  NEURO: Cranial nerves grossly intact.  Mentation and speech appropriate for age.  PSYCH: Appropriate affect, tone, and pace of words     Labs/Imaging     Pertinent Labs were reviewed and updated in EPIC and discussed briefly.  Radiology Results were  reviewed and updated in EPIC and discussed briefly.    Summary of recent findings:   Lab Results   Component Value Date    A1C 5.6 06/08/2023    A1C 5.9 09/22/2022       No results found for: \"TSH\", \"T4\"    No results found for: \"CR\"    Recent Labs   Lab Test 09/17/24  1656 06/14/17  0000   CHOL 203* 207*   HDL 93 85   LDL 96 102*   TRIG 68 102       No results found for: \"[id number]\", \"[id number]\", \"[id number]\"    I personally reviewed the patient's outside records from [facility] EMR, Care Everywhere, and faxed records. Summary of pertinent findings in \[facility] [place] [facility]\"".    Impression / Plan     1.  Osteoporosis  Risk factors for bone loss include age, postmenopausal status, family history of osteoporosis with mother having hip fracture.  She did not tolerate Fosamax in the past due to jaw pain.  She has been on raloxifene for around 10 years.  Most recent DEXA scan in 2022 with some improvement in her BMD.  She has been following with regular DEXA scan by an outside facility.  She would like to switch care to [place].  We will plan to get a new baseline DEXA scan by [place] machine.  Will decide on further osteoporosis treatment based on DEXA scan result.  We briefly reviewed antiresorptive medication options including IV Reclast and Prolia.  I do not see any baseline labs and will get labs below.  She might be planning for more tooth extraction and dental implant in the near future, she has dentist follow-up in September.      Test and/or medications prescribed today:  No " orders of the defined types were placed in this encounter.        Follow up: In 3-month    The longitudinal plan of care for the diagnosis(es)/condition(s) as documented were addressed during this visit. Due to the added complexity in care, I will continue to support Latonya in the subsequent management and with ongoing continuity of care.       Aleyda Mo MD  Endocrinology, Diabetes and Metabolism  [facility]      Again, thank you for allowing me to participate in the care of your patient.        Sincerely,        Aleyda Mo MD    Electronically signed

## 2025-07-23 NOTE — NURSING NOTE
Current patient location: [place]    Is the patient currently in the state of [place]? YES    Visit mode: VIDEO    If the visit is dropped, the patient can be reconnected by:VIDEO VISIT: Text to cell phone:   Telephone Information:   Mobile [phone]       Will anyone else be joining the visit? NO  (If patient encounters technical issues they should call [phone])    Are changes needed to the allergy or medication list? No    Are refills needed on medications prescribed by this physician? NO    Rooming Documentation:  Questionnaire(s) completed    Reason for visit: Consult    Anupama FORD

## 2025-07-23 NOTE — PATIENT INSTRUCTIONS
Please reach out to the following centers to schedule your appointment:       Imaging (DEXA, CT, MRI, XRAY)    [facility] ([facility] [place] [phone]   [facility] ([place]) [phone]   [facility] ([facility]) [phone]   [facility] ([facility]) [phone]       [place] [phone]   [facility] [phone]   [place] [phone]   [facility]  [phone]   [facility] [phone]   [place] [phone]   [facility]) [phone]   [facility]   [place] [phone]   [place] [phone]   [place] [phone]   [place] [phone]       Infusion    [facility] [phone]   [place] [phone]   [place] [phone]   [place] [phone]   [place] [phone]   [place] [phone]   [place] [phone]     For any questions, please reach out to the Endocrinology Clinic Number for assistance: [phone].

## 2025-08-17 ENCOUNTER — HEALTH MAINTENANCE LETTER (OUTPATIENT)
Age: 70
End: 2025-08-17

## (undated) RX ORDER — GLYCOPYRROLATE 0.2 MG/ML
INJECTION, SOLUTION INTRAMUSCULAR; INTRAVENOUS
Status: DISPENSED
Start: 2025-01-06

## (undated) RX ORDER — LIDOCAINE HYDROCHLORIDE 10 MG/ML
INJECTION, SOLUTION EPIDURAL; INFILTRATION; INTRACAUDAL; PERINEURAL
Status: DISPENSED
Start: 2025-01-06

## (undated) RX ORDER — PROPOFOL 10 MG/ML
INJECTION, EMULSION INTRAVENOUS
Status: DISPENSED
Start: 2025-01-06